# Patient Record
Sex: MALE | Race: WHITE
[De-identification: names, ages, dates, MRNs, and addresses within clinical notes are randomized per-mention and may not be internally consistent; named-entity substitution may affect disease eponyms.]

---

## 2018-01-29 ENCOUNTER — HOSPITAL ENCOUNTER (INPATIENT)
Dept: HOSPITAL 96 - M.ERS | Age: 78
LOS: 3 days | Discharge: HOME | DRG: 683 | End: 2018-02-01
Attending: INTERNAL MEDICINE | Admitting: INTERNAL MEDICINE
Payer: OTHER GOVERNMENT

## 2018-01-29 VITALS — DIASTOLIC BLOOD PRESSURE: 59 MMHG | SYSTOLIC BLOOD PRESSURE: 131 MMHG

## 2018-01-29 VITALS — DIASTOLIC BLOOD PRESSURE: 73 MMHG | SYSTOLIC BLOOD PRESSURE: 157 MMHG

## 2018-01-29 VITALS — BODY MASS INDEX: 23.62 KG/M2 | HEIGHT: 70 IN | WEIGHT: 165 LBS

## 2018-01-29 DIAGNOSIS — M94.0: ICD-10-CM

## 2018-01-29 DIAGNOSIS — K21.9: ICD-10-CM

## 2018-01-29 DIAGNOSIS — Z88.0: ICD-10-CM

## 2018-01-29 DIAGNOSIS — N17.9: Primary | ICD-10-CM

## 2018-01-29 DIAGNOSIS — I12.0: ICD-10-CM

## 2018-01-29 DIAGNOSIS — E11.22: ICD-10-CM

## 2018-01-29 DIAGNOSIS — Z86.73: ICD-10-CM

## 2018-01-29 DIAGNOSIS — N18.3: ICD-10-CM

## 2018-01-29 DIAGNOSIS — Z79.899: ICD-10-CM

## 2018-01-29 DIAGNOSIS — E44.1: ICD-10-CM

## 2018-01-29 DIAGNOSIS — Z79.4: ICD-10-CM

## 2018-01-29 DIAGNOSIS — I25.10: ICD-10-CM

## 2018-01-29 LAB
ABSOLUTE BASOPHILS: 0 THOU/UL (ref 0–0.2)
ABSOLUTE EOSINOPHILS: 0.2 THOU/UL (ref 0–0.7)
ABSOLUTE MONOCYTES: 0.8 THOU/UL (ref 0–1.2)
ALBUMIN SERPL-MCNC: 4 G/DL (ref 3.4–5)
ALP SERPL-CCNC: 101 U/L (ref 46–116)
ALT SERPL-CCNC: 24 U/L (ref 30–65)
ANION GAP SERPL CALC-SCNC: 9 MMOL/L (ref 7–16)
APTT BLD: 26.7 SECONDS (ref 25–31.3)
AST SERPL-CCNC: 23 U/L (ref 15–37)
BASOPHILS NFR BLD AUTO: 0.5 %
BILIRUB SERPL-MCNC: 0.7 MG/DL
BUN SERPL-MCNC: 42 MG/DL (ref 7–18)
CALCIUM SERPL-MCNC: 9.1 MG/DL (ref 8.5–10.1)
CHLORIDE SERPL-SCNC: 105 MMOL/L (ref 98–107)
CO2 SERPL-SCNC: 27 MMOL/L (ref 21–32)
CREAT SERPL-MCNC: 2.4 MG/DL (ref 0.6–1.3)
EOSINOPHIL NFR BLD: 1.9 %
GLUCOSE SERPL-MCNC: 127 MG/DL (ref 70–99)
GRANULOCYTES NFR BLD MANUAL: 61.1 %
HCT VFR BLD CALC: 41.9 % (ref 42–52)
HGB BLD-MCNC: 13.9 GM/DL (ref 14–18)
INR PPP: 1.1
LIPASE: 258 U/L (ref 73–393)
LYMPHOCYTES # BLD: 2.6 THOU/UL (ref 0.8–5.3)
LYMPHOCYTES NFR BLD AUTO: 28.1 %
MAGNESIUM SERPL-MCNC: 2.1 MG/DL (ref 1.8–2.4)
MCH RBC QN AUTO: 29.4 PG (ref 26–34)
MCHC RBC AUTO-ENTMCNC: 33.2 G/DL (ref 28–37)
MCV RBC: 88.6 FL (ref 80–100)
MONOCYTES NFR BLD: 8.4 %
MPV: 8.7 FL. (ref 7.2–11.1)
NEUTROPHILS # BLD: 5.7 THOU/UL (ref 1.6–8.1)
NT-PRO BRAIN NAT PEPTIDE: 58 PG/ML (ref ?–300)
NUCLEATED RBCS: 0 /100WBC
PLATELET COUNT*: 196 THOU/UL (ref 150–400)
POTASSIUM SERPL-SCNC: 4.3 MMOL/L (ref 3.5–5.1)
PROT SERPL-MCNC: 7.7 G/DL (ref 6.4–8.2)
PROTHROMBIN TIME: 11.1 SECONDS (ref 9.2–11.5)
RBC # BLD AUTO: 4.74 MIL/UL (ref 4.5–6)
RDW-CV: 12.9 % (ref 10.5–14.5)
SODIUM SERPL-SCNC: 141 MMOL/L (ref 136–145)
TROPONIN-I LEVEL: <0.06 NG/ML (ref ?–0.06)
WBC # BLD AUTO: 9.2 THOU/UL (ref 4–11)

## 2018-01-29 NOTE — EKG
[place] [address]
Phone:  [phone]                     ELECTROCARDIOGRAM REPORT      
_______________________________________________________________________________
 
Name:       PICKARDELVIRA                    Room:           [place] [address]    ADM IN  
[facility]#:  [id number]      Account #:      [id number]  
Admission:  18     Attend Phys:    Joe Boucher MD 
Discharge:               Date of Birth:  [birth year]  
         Report #: [id number]
    [id number]
_______________________________________________________________________________
THIS REPORT FOR:  //name//                      
 
                         [facility] ED
                                       
Test Date:    2018               Test Time:    14:10:48
Pat Name:     ELVIRA PICKARD              Department:   
Patient ID:   SMAMO-                   Room:          
Gender:                               Technician:   DAVID NUR
:          1940               Requested By: Gary Velasquez
Order Number: [id number] MD:   Andre Lewis
                                 Measurements
Intervals                              Axis          
Rate:         73                       P:            82
MO:           201                      QRS:          -10
QRSD:         88                       T:            41
QT:           369                                    
QTc:          407                                    
                           Interpretive Statements
Sinus rhythm
Borderline low voltage, extremity leads
Compared to ECG 2017 04:02:11
nonspecific intraventricular defect no longer noted
 
Electronically Signed On 2018 16:53:13 CST by Andre Lewis
[email or web address]
 
 
 
 
 
 
 
 
 
 
 
 
 
 
 
 
 
 
  <ELECTRONICALLY SIGNED>
                                           By: Andre Lewis MD, [facility]      
  18     1653
D: 18 1410   _____________________________________
T: 18 1410   Andre Lewis MD, [facility]        /EPI

## 2018-01-29 NOTE — NUR
ADMISSION HX AND ASSESSMENT COMPLETED BY HOUSE SUPERVISOR, THIS RN AGREES WITH
CHARTING. PATIENT ON ROOM AIR WITH O2 SATS 98%. PATIENT DENIES CHEST PAIN AND
DISCOMFORT. NO DISTRESS NOTED. PATIENT A&OX3, FORGETFUL AND CONFUSED AT TIMES.
IV REINFORCED WITH COBAN. PATIENT ORIENTED TO ROOM AND CALL LIGHT. CALL LIGHT
WITHIN REACH.

## 2018-01-30 VITALS — SYSTOLIC BLOOD PRESSURE: 127 MMHG | DIASTOLIC BLOOD PRESSURE: 69 MMHG

## 2018-01-30 VITALS — SYSTOLIC BLOOD PRESSURE: 145 MMHG | DIASTOLIC BLOOD PRESSURE: 78 MMHG

## 2018-01-30 VITALS — DIASTOLIC BLOOD PRESSURE: 53 MMHG | SYSTOLIC BLOOD PRESSURE: 101 MMHG

## 2018-01-30 VITALS — SYSTOLIC BLOOD PRESSURE: 127 MMHG | DIASTOLIC BLOOD PRESSURE: 100 MMHG

## 2018-01-30 VITALS — DIASTOLIC BLOOD PRESSURE: 58 MMHG | SYSTOLIC BLOOD PRESSURE: 119 MMHG

## 2018-01-30 LAB
ABSOLUTE BASOPHILS: 0 THOU/UL (ref 0–0.2)
ABSOLUTE EOSINOPHILS: 0.1 THOU/UL (ref 0–0.7)
ABSOLUTE MONOCYTES: 0.6 THOU/UL (ref 0–1.2)
ANION GAP SERPL CALC-SCNC: 10 MMOL/L (ref 7–16)
BASOPHILS NFR BLD AUTO: 0.6 %
BUN SERPL-MCNC: 37 MG/DL (ref 7–18)
CALCIUM SERPL-MCNC: 8.3 MG/DL (ref 8.5–10.1)
CHLORIDE SERPL-SCNC: 109 MMOL/L (ref 98–107)
CO2 SERPL-SCNC: 24 MMOL/L (ref 21–32)
CREAT SERPL-MCNC: 2.1 MG/DL (ref 0.6–1.3)
EOSINOPHIL NFR BLD: 2.7 %
GLUCOSE SERPL-MCNC: 177 MG/DL (ref 70–99)
GRANULOCYTES NFR BLD MANUAL: 52.7 %
HCT VFR BLD CALC: 37.8 % (ref 42–52)
HGB BLD-MCNC: 12.8 GM/DL (ref 14–18)
LYMPHOCYTES # BLD: 1.7 THOU/UL (ref 0.8–5.3)
LYMPHOCYTES NFR BLD AUTO: 32.8 %
MCH RBC QN AUTO: 30.1 PG (ref 26–34)
MCHC RBC AUTO-ENTMCNC: 33.9 G/DL (ref 28–37)
MCV RBC: 88.9 FL (ref 80–100)
MONOCYTES NFR BLD: 11.2 %
MPV: 9.4 FL. (ref 7.2–11.1)
NEUTROPHILS # BLD: 2.8 THOU/UL (ref 1.6–8.1)
NT-PRO BRAIN NAT PEPTIDE: 85 PG/ML (ref ?–300)
NUCLEATED RBCS: 0 /100WBC
PLATELET COUNT*: 163 THOU/UL (ref 150–400)
POTASSIUM SERPL-SCNC: 4.1 MMOL/L (ref 3.5–5.1)
RBC # BLD AUTO: 4.26 MIL/UL (ref 4.5–6)
RDW-CV: 12.8 % (ref 10.5–14.5)
SODIUM SERPL-SCNC: 143 MMOL/L (ref 136–145)
TROPONIN-I LEVEL: <0.06 NG/ML (ref ?–0.06)
WBC # BLD AUTO: 5.2 THOU/UL (ref 4–11)

## 2018-01-30 NOTE — NUR
JOSE ELIAS RESTING IN BED.  RENAL US, ECHO, VENOUS DOPPLERS ALL COMPLETED TODAY
AND RESULTS POSTED.  ASIFNET TO [facility] STRESS TESTING ON 01/31/18.  VITAL SIGNS
STABLE AND PATIENT IN NOAPPARENT DISTRESS AT THIS TIME.  HOURLY ROUNDING
COMPLETED FOR PATIENT SAFETY.  NPO AFTER MIDNINGHT

## 2018-01-30 NOTE — NUR
PATIENT REMAINS CONFUSED THROUGHOUT SHIFT, OCCASIONALLY ATTEMPTING TO PULL OUT
IV AND CLIMB OUT OF BED WITHOUT ASSISTANCE. ATTEMPTED TO ORIENT PATIENT
MULTIPLE TIMES WITH NO SUCCESS. FALL PRECAUTIONS IN PLACE. HOURLY ROUNDING
OBSERVED. CALL LIGHT WITHIN REACH

## 2018-01-30 NOTE — 2DMMODE
[place] [address]
Phone:  [phone] 2 D/M-MODE ECHOCARDIOGRAM     
_______________________________________________________________________________
 
Name:         ELVIRA PICKARD                 Room:          [address] [place] 
[facility]#:    [id number]     Account #:     [id number]  
Admission:    18    Attend Phys:   Joe Boucher, 
Discharge:                Date of Birth: [birth year]  
Date of Service: 18 1706  Report #:      [id number]
        [id number]
_______________________________________________________________________________
THIS REPORT FOR:  //name//                      
 
 
--------------- APPROVED REPORT --------------
 
 
Study performed:  2018 16:20:04
 
EXAM: Comprehensive 2D, Doppler, and color-flow 
Echocardiogram 
Patient Location: In-Patient   
Room #:  [facility]     Status:  routine
 
      BSA:         2.00
HR: 71 bpm BP:          119/58 mmHg 
Rhythm: NSR     
 
Other Information 
Study Quality: Good
 
Indications
Chest Pain
 
2D Dimensions
   LVEF(%):  66.56 (&gt;50%)
IVSd:  10.57 (7-11mm) LVOT Diam:  21.08 (18-24mm) 
LVDd:  44.10 mm  
PWd:  11.07 (7-11mm) Ascending Ao:  33.22 (22-36mm)
LVDs:  27.97 (25-40mm) 
Aortic Root:  32.47 mm 
   Cunningham's LVEF:  66.56 %
 
Volumes
Left Atrial Volume (Systole) 
    LA ESV Index:  25.30 mL/m2
 
Aortic Valve
AoV Peak Chetan.:  1.57 m/s 
AO Peak Gr.:  9.87 mmHg  LVOT Max P.17 mmHg
AO Mean Gr.:  5.60 mmHg  LVOT Mean P.55 mmHg
    LVOT Max V:  1.14 m/s
AO V2 VTI:  31.58 cm  LVOT Mean V:  0.73 m/s
LORRAINE (VTI):  2.60 cm2  LVOT V1 VTI:  23.57 cm
 
Mitral Valve
    E/A Ratio:  1.26
 
 
[place] [address]
Phone:  [phone]                     2 D/M-MODE ECHOCARDIOGRAM     
_______________________________________________________________________________
 
Name:         ELVIRA PICKARD                 Room:          [address] [place] 
[facility]#:    [id number]     Account #:     [id number]  
Admission:    18    Attend Phys:   Joe Boucher, 
Discharge:                Date of Birth: [birth year]  
Date of Service: 18 1706  Report #:      [id number]
        [id number]
_______________________________________________________________________________
    MV Decel. Time:  169.90 ms
MV E Max Chetan.:  0.93 m/s 
MV PHT:  49.27 ms  
MVA (PHT):  4.47 cm2  
 
TDI
E/Lateral E':  8.45 E/Medial E':  7.15
   Medial E' Chetan.:  0.13 m/s
   Lateral E' Chetan.:  0.11 m/s
 
Pulmonary Valve
PV Peak Chetan.:  0.98 m/s PV Peak Gr.:  3.87 mmHg
 
Left Ventricle
The left ventricle is normal size. There is normal LV segmental wall 
motion. There is normal left ventricular wall thickness. Left 
ventricular systolic function is normal. LVEF is 60-65%. The left 
ventricular diastolic function is normal.
 
Right Ventricle
The right ventricle is normal size. The right ventricular systolic 
function is normal.
 
Atria
The left atrium size is normal. The right atrium size is 
normal.
 
Aortic Valve
The aortic valve is normal in structure. No aortic regurgitation is 
present. There is no aortic valvular stenosis.
 
Mitral Valve
The mitral valve is normal in structure. Trace mitral regurgitation. 
No evidence of mitral valve stenosis.
 
Tricuspid Valve
The tricuspid valve is normal in structure. Unable to assess PA 
pressure. Trace tricuspid regurgitation.
 
Pulmonic Valve
The pulmonary valve is normal in structure. There is no pulmonic 
valvular regurgitation.
 
Great Vessels
The aortic root is normal in size. IVC is normal in size and 
collapses with &gt;50% inspiration
 
 
[place] [address]
Phone:  [phone]                     2 D/M-MODE ECHOCARDIOGRAM     
_______________________________________________________________________________
 
Name:         ELVIRA PICKARD                 Room:          [address] [place] 
[facility].#:    [id number]     Account #:     [id number]  
Admission:    18    Attend Phys:   Joe Boucher, 
Discharge:                Date of Birth: [birth year]  
Date of Service: 18 1706  Report #:      [id number]
        [id number]
_______________________________________________________________________________
 
Pericardium
There is no pericardial effusion.
 
&lt;Conclusion&gt;
The left ventricle is normal size.
There is normal left ventricular wall thickness.
Left ventricular systolic function is normal.
LVEF is 60-65%.
The left ventricular diastolic function is normal.
IVC is normal in size and collapses with &gt;50% inspiration
 
 
 
 
 
 
 
 
 
 
 
 
 
 
 
 
 
 
 
 
 
 
 
 
 
 
 
 
 
 
 
 
 
  <ELECTRONICALLY SIGNED>
                                           By: Tevin Chavis MD, FACC   
  18     1706
D: 18 170   _____________________________________
T: 18   Tevin Chavis MD, FACC     /INF

## 2018-01-30 NOTE — NUR
ASSESSMENT AND VITALS COMPLETED AS CHARTED, VSS. CARDIAC MONITOR IN PLACE
TRACING SR. PATIENT DENIES PAIN AND DISCOMFORT. PATIENT ON ROOM AIR WITH SATS
UPPER 90'S. GOAL THIS SHIFT IS TO REMAIN FREE OF CHEST PAIN AND REST
COMFORTABLY. CALL LIGHT WITHIN REACH

## 2018-01-31 VITALS — SYSTOLIC BLOOD PRESSURE: 115 MMHG | DIASTOLIC BLOOD PRESSURE: 67 MMHG

## 2018-01-31 VITALS — DIASTOLIC BLOOD PRESSURE: 53 MMHG | SYSTOLIC BLOOD PRESSURE: 113 MMHG

## 2018-01-31 VITALS — SYSTOLIC BLOOD PRESSURE: 169 MMHG | DIASTOLIC BLOOD PRESSURE: 84 MMHG

## 2018-01-31 VITALS — DIASTOLIC BLOOD PRESSURE: 71 MMHG | SYSTOLIC BLOOD PRESSURE: 139 MMHG

## 2018-01-31 VITALS — SYSTOLIC BLOOD PRESSURE: 110 MMHG | DIASTOLIC BLOOD PRESSURE: 48 MMHG

## 2018-01-31 VITALS — SYSTOLIC BLOOD PRESSURE: 118 MMHG | DIASTOLIC BLOOD PRESSURE: 66 MMHG

## 2018-01-31 VITALS — DIASTOLIC BLOOD PRESSURE: 66 MMHG | SYSTOLIC BLOOD PRESSURE: 118 MMHG

## 2018-01-31 VITALS — SYSTOLIC BLOOD PRESSURE: 123 MMHG | DIASTOLIC BLOOD PRESSURE: 66 MMHG

## 2018-01-31 LAB
ABSOLUTE BASOPHILS: 0 THOU/UL (ref 0–0.2)
ABSOLUTE EOSINOPHILS: 0.2 THOU/UL (ref 0–0.7)
ABSOLUTE MONOCYTES: 0.7 THOU/UL (ref 0–1.2)
ALBUMIN SERPL-MCNC: 3 G/DL (ref 3.4–5)
ALP SERPL-CCNC: 89 U/L (ref 46–116)
ALT SERPL-CCNC: 16 U/L (ref 30–65)
ANION GAP SERPL CALC-SCNC: 8 MMOL/L (ref 7–16)
AST SERPL-CCNC: 22 U/L (ref 15–37)
BASOPHILS NFR BLD AUTO: 0.6 %
BILIRUB SERPL-MCNC: 0.7 MG/DL
BUN SERPL-MCNC: 23 MG/DL (ref 7–18)
CALCIUM SERPL-MCNC: 8.1 MG/DL (ref 8.5–10.1)
CHLORIDE SERPL-SCNC: 113 MMOL/L (ref 98–107)
CO2 SERPL-SCNC: 25 MMOL/L (ref 21–32)
CREAT SERPL-MCNC: 1.5 MG/DL (ref 0.6–1.3)
EOSINOPHIL NFR BLD: 3.2 %
GLUCOSE SERPL-MCNC: 98 MG/DL (ref 70–99)
GRANULOCYTES NFR BLD MANUAL: 52.3 %
HCT VFR BLD CALC: 36.8 % (ref 42–52)
HGB BLD-MCNC: 12.5 GM/DL (ref 14–18)
LYMPHOCYTES # BLD: 1.9 THOU/UL (ref 0.8–5.3)
LYMPHOCYTES NFR BLD AUTO: 32 %
MCH RBC QN AUTO: 29.8 PG (ref 26–34)
MCHC RBC AUTO-ENTMCNC: 34.1 G/DL (ref 28–37)
MCV RBC: 87.5 FL (ref 80–100)
MONOCYTES NFR BLD: 11.9 %
MPV: 8.9 FL. (ref 7.2–11.1)
NEUTROPHILS # BLD: 3.1 THOU/UL (ref 1.6–8.1)
NUCLEATED RBCS: 0 /100WBC
PLATELET COUNT*: 170 THOU/UL (ref 150–400)
POTASSIUM SERPL-SCNC: 4.3 MMOL/L (ref 3.5–5.1)
PROT SERPL-MCNC: 5.6 G/DL (ref 6.4–8.2)
RBC # BLD AUTO: 4.21 MIL/UL (ref 4.5–6)
RDW-CV: 12.9 % (ref 10.5–14.5)
SODIUM SERPL-SCNC: 146 MMOL/L (ref 136–145)
WBC # BLD AUTO: 5.9 THOU/UL (ref 4–11)

## 2018-01-31 NOTE — NUR
CM ASSESSMENT:
Pt is A&Ox1-2 with confusion. Resides at home with his wife. Pt is independent
with bathing and grooming, wife assists as needed and completes cooking,
cleaning and driving. Pt has a walker and cane at home that he can use. No hx
of HH or SNF. Goal is to return home once medically stable for dc. Following
for dc needs.

## 2018-01-31 NOTE — CON
[address]  [address]                    CONSULTATION                  
_______________________________________________________________________________
 
Name:       ELVIRA PICKARD                  Room:           [address] [place]  
.R.#:  [id number]      Account #:      [id number]  
Admission:  01/29/18     Attend Phys:    Joe Boucher MD 
Discharge:               Date of Birth:  [birth year]  
         Report #: [id number]
                                                                     [id number]  
_______________________________________________________________________________
THIS REPORT FOR:  //name//                      
 
CC: Joe Boucher
    [facility]
 
DATE OF SERVICE:  01/30/2018
 
 
REQUESTING PHYSICIAN:  Joe Boucher MD.
 
REASON FOR CONSULTATION:  Acute on chronic kidney disease.
 
HISTORY OF PRESENT ILLNESS:  The patient is a 77-year-old white male with
medical history significant for diabetes mellitus type 2 and hypertension, who
presents to the hospital with complaints of chest discomfort.
 
The patient states that the chest discomfort started in the morning of
01/29/2018.  He used nitroglycerin, but did not get any relief.  He was afraid
of having heart attack and he was brought to the Emergency Room.
 
The patient himself is very poor historian because of history of cerebrovascular
accident.
 
At the time of my examination, he feels much better, states he has no
complaints.
 
PAST MEDICAL HISTORY:
1.  Diabetes mellitus type 2.
2.  Hypertension.
3.  Chronic kidney disease, stage 3.
4.  Coronary artery disease.
 
FAMILY HISTORY:  Noncontributory.
 
SOCIAL HISTORY:  No current tobacco or alcohol abuse.
 
MEDICATIONS:  Prior to admission are reviewed.  From my standpoint, he was on
furosemide 40 mg a day and losartan 25 mg a day.
 
REVIEW OF SYSTEMS:  Positive for the symptoms as I mentioned earlier.  At the
time of my examination, all systems reviewed and are negative.
 
PHYSICAL EXAMINATION:
GENERAL:  Awake, alert, and oriented, in no acute distress.
VITAL SIGNS:  His blood pressure is 119/58 and heart rate 73, afebrile.
 
 
 
[place] [address]                    CONSULTATION                  
_______________________________________________________________________________
 
Name:       ELVIRA PICKARD                  Room:           [address] [place]  
[facility]#:  [id number]      Account #:      [id number]  
Admission:  01/29/18     Attend Phys:    Joe Boucher MD 
Discharge:               Date of Birth:  [birth year]  
         Report #: [id number]
                                                                     [id number]  
_______________________________________________________________________________
HEENT:  His pupils are round.
NECK:  Supple.
LUNGS:  Clear.
CARDIOVASCULAR:  Regular rate.
ABDOMEN:  Soft.
EXTREMITIES:  Lower extremity, no edema.
 
Here in the hospital, he was placed on aspirin, Norvasc, Protonix, Keppra,
insulin, and Lipitor, but his diuretics and his lisinopril are on hold.
 
LABORATORY AND DIAGNOSTIC DATA:  His lab report revealed serum sodium 143,
potassium 4.1, chloride 109, carbon dioxide 24, BUN 37, and creatinine 2.1. 
Renal ultrasound was done today and was unremarkable with normal echogenicity.
 
ASSESSMENT AND PLAN:  A 77-year-old gentleman admitted for chest discomfort. 
Cardiology is on the case.  From my standpoint, his creatinine is improving.  I
do not know exact baseline of his creatinine, but apparently he has chronic
kidney disease.  I will try to get report from his primary care doctor.  From my
standpoint, doing well.  We will continue to monitor.  He will need to have
follow up with us after discharge.
 
Thank you very much for asking my opinion on acute and chronic kidney disease of
this patient.
 
 
 
 
 
 
 
 
 
 
 
 
 
 
 
 
 
 
 
 
 
<ELECTRONICALLY SIGNED>
                                        By:  Amari Pulido MD      
01/31/18     1501
D: 01/30/18 1557_______________________________________
T: 01/31/18 0055Alexmary Pulido MD         /PMT

## 2018-01-31 NOTE — NUR
ASSESSMENT AND VITALS COMPLETED AS CHARTED, VSS. CARDIAC MONITOR IN PLACE
TRACING SR, 1D AVB. PATIENT DENIES PAIN AND DISCOMFORT. PATIENT ON ROOM AIR
WITH O2 SATS 98%. PATIENT RESTLESS, IMPULSIVE, AND PULLING OFF HEART MONITOR
UPON ASSESSMENT. ORDERS RECEIVED FOR SEROQUEL ONETIME. PATIENT RESTING
COMFORTABLY AT THIS TIME. FALL PRECAUTIONS IN PLACE. CALL LIGHT WITHIN REACH

## 2018-01-31 NOTE — NUR
PATIENT PROGRESSING TOWARDS GOALS: PATIENT DENIES CHEST PAIN THROUGHOUT SHIFT.
NPO FOR STRESS TEST THIS AM. PATIENT DID HAVE AN EPISODE OF IMPULSIVENESS,
TAKING OFF CARDIAC MONITOR MULTIPLE TIMES AND ATTEMPTING TO DISCONTINUE IV.
ORDERS RECEIVED FOR SEROQUEL ONE TIME. PATIENT WAS ABLE TO REST COMFORTABLY
AFTER ADMINISTRATION. HOURLY ROUNDING OSBERVED. CALL LIGHT WITHIN REACH

## 2018-01-31 NOTE — NUR
ASSUMED CARE OF PT AROUND 0700 THIS AM. REFER TO ASSESSMENT. PT VOICES NO
CONCERNS THIS AM. ALERT AND ORIENTED TO PERSON ONLY. PT'S WIFE AT BEDSIDE AND
STATES THIS IS BASELINE FOR PT. PT VERY IMPULSIVE. FALL PRECAUTIONS IN PLACE.
TELE SR. CLWR. WCTM.

## 2018-01-31 NOTE — NUR
PT NOT PROGRESSING TOWARDS GOALS THIS SHIFT. PT IMPULSIVE AND NONCOMPLIANT
WITH FALL RISK PRECAUTIONS. ATTEMPTED TO HAVE WIFE AT BEDSIDE TO INCREASE PT
SAFETY AND WIFE DID NOT MAKE ANY ATTEMPT TO REORIENT PT WHEN PULLING OFF
TUBES/ LINES AND ATTEMPTING TO GET OUT OF. CONSIDER 1:1 FOR SAFETY IF
CONTINUES. CARDIAC STRESS TEST RESCHEDULED FOR TOMORROW D/T PT RECEIVED
AGGRENOX DOSE YESTERDAY. NO OTHER CONCERNS AT THIS TIME. CLWR. WCTM.

## 2018-02-01 VITALS — DIASTOLIC BLOOD PRESSURE: 64 MMHG | SYSTOLIC BLOOD PRESSURE: 125 MMHG

## 2018-02-01 VITALS — SYSTOLIC BLOOD PRESSURE: 134 MMHG | DIASTOLIC BLOOD PRESSURE: 74 MMHG

## 2018-02-01 VITALS — DIASTOLIC BLOOD PRESSURE: 67 MMHG | SYSTOLIC BLOOD PRESSURE: 132 MMHG

## 2018-02-01 VITALS — SYSTOLIC BLOOD PRESSURE: 125 MMHG | DIASTOLIC BLOOD PRESSURE: 64 MMHG

## 2018-02-01 LAB
ABSOLUTE BASOPHILS: 0.1 THOU/UL (ref 0–0.2)
ABSOLUTE EOSINOPHILS: 0.2 THOU/UL (ref 0–0.7)
ABSOLUTE MONOCYTES: 0.6 THOU/UL (ref 0–1.2)
ALBUMIN SERPL-MCNC: 3 G/DL (ref 3.4–5)
ALP SERPL-CCNC: 85 U/L (ref 46–116)
ALT SERPL-CCNC: 19 U/L (ref 30–65)
ANION GAP SERPL CALC-SCNC: 4 MMOL/L (ref 7–16)
AST SERPL-CCNC: 21 U/L (ref 15–37)
BASOPHILS NFR BLD AUTO: 1 %
BILIRUB SERPL-MCNC: 0.7 MG/DL
BUN SERPL-MCNC: 19 MG/DL (ref 7–18)
CALCIUM SERPL-MCNC: 8.4 MG/DL (ref 8.5–10.1)
CHLORIDE SERPL-SCNC: 113 MMOL/L (ref 98–107)
CO2 SERPL-SCNC: 25 MMOL/L (ref 21–32)
CREAT SERPL-MCNC: 1.5 MG/DL (ref 0.6–1.3)
EOSINOPHIL NFR BLD: 4.7 %
GLUCOSE SERPL-MCNC: 184 MG/DL (ref 70–99)
GRANULOCYTES NFR BLD MANUAL: 48.6 %
HCT VFR BLD CALC: 38.1 % (ref 42–52)
HGB BLD-MCNC: 12.9 GM/DL (ref 14–18)
LYMPHOCYTES # BLD: 1.6 THOU/UL (ref 0.8–5.3)
LYMPHOCYTES NFR BLD AUTO: 33.3 %
MCH RBC QN AUTO: 29.4 PG (ref 26–34)
MCHC RBC AUTO-ENTMCNC: 33.8 G/DL (ref 28–37)
MCV RBC: 86.9 FL (ref 80–100)
MONOCYTES NFR BLD: 12.4 %
MPV: 8.8 FL. (ref 7.2–11.1)
NEUTROPHILS # BLD: 2.3 THOU/UL (ref 1.6–8.1)
NUCLEATED RBCS: 0 /100WBC
PLATELET COUNT*: 162 THOU/UL (ref 150–400)
POTASSIUM SERPL-SCNC: 4.3 MMOL/L (ref 3.5–5.1)
PROT SERPL-MCNC: 5.9 G/DL (ref 6.4–8.2)
RBC # BLD AUTO: 4.38 MIL/UL (ref 4.5–6)
RDW-CV: 13 % (ref 10.5–14.5)
SODIUM SERPL-SCNC: 142 MMOL/L (ref 136–145)
WBC # BLD AUTO: 4.8 THOU/UL (ref 4–11)

## 2018-02-01 NOTE — NUR
ASSUMEDPT CARE AT 0730, FULL ASSESMENT DONE AS CHARTED. PT A/O X3, IS CONUSED
AT TIMES AND IMPULSIVE, SITTER WITH PT AT THIS TIME FOR SAFTY. PTS VSS, SR ON
THE MONITOR, DENIES PAIN. FALL PRECATUIONS IN PLACE. CALL LIGHT IN REACH BUT P
DOES NOT USE IT APPROPIALTY. WILL CONINTUE WITH PLAN OF CARE.

## 2018-02-01 NOTE — NUR
Pt should dc to home today, pending stress test. Wife expressed wanting HH, CM
[facility], and was informed that wife/Pt will need to
contact Pt's PCP, to set up HH. Left info in dc summary.

## 2018-02-01 NOTE — NUR
PATIENT REMAINED IMPULSIVE OVERNIGHT AND CLIMBING OUT OF BED. PATIENT CLIMBED
OUT OF BED AT 2320, TRIPPED OVER THE CHAIR AND BUMPED HIS HEAD ON THE
WALL SUCTION BOX. PATIENT DENIES PAIN. VITAL SIGNS TAKEN AND STABLE. NOTIFIED
DR. NAZARIO OF THE SITUATION, NO NEW ORDERS RECEIVED. FALL PRECAUTIONS IN
PLACE. CALL LIGHT WITHIN REACH

## 2018-02-01 NOTE — CARDNUC
[place] [address]
Phone:  [phone]                     CARDIAC NUCLEAR IMAGING REPORT
_______________________________________________________________________________
 
Name:         ELVIRA PICKARD                 Room:          [address] [place] 
[facility]#:    [id number]     Account #:     [id number]  
Admission:    18    Attend Phys:   Joe Boucher, 
Discharge:                Date of Birth: [birth year]  
Date of Service: 18 1459  Report #:      [id number]
        [id number] 
_______________________________________________________________________________
THIS REPORT FOR:  //name//                      
 
 
--------------- APPROVED REPORT --------------
 
 
Exam: Nuclear Stress Test
Indication: Chest pain
Patient Location: In-Patient
Room #: 214
Stress Tech: Kaia Yoon
Stress Nurse: Svetlana Bruner RN
NM Tech:GHADA Anderson
 
Ht: 5 ft 10 in  Wt: 180 lbs  BSA:  2.00 m2
    BMI:  25.82
 
Medical History
Medical History: cad, cva, hyperlipidemia, htn, diabetes
Medications: amlodipine, aspiriain, dipyridamole, 
atorvastatin
Allergies: penicillin
Cardiac Risk Factors: age, hyperlipidemia, htn, diabetes
Exercise History: Sedentary
Meds Held (48 hrs): dipyridomole
 
Stress Test Details
Stress Test:  Pharmacologic stress testing performed using 0.4 mg of 
regadenoson per 5 mL given IV over 10 seconds.      
  Reason for pharmacologic stress test: physical 
limitation.      
HR           
Resting HR:            94 bpm   Max Heart Rate (APMHR): 145 bpm  
Max HR Achieved:  102 bpm   Target HR (85% APMHR): 123 bpm  
% of APMHR:         70         
Recovery HR:            98 bpm        
 
BP           
Resting BP:  153/78 mmHg        
Max BP:       103/48 mmHg        
 
ECG           
Resting ECG:  Sinus Rhythm, normal EKG       
Stress ECG:     Sinus Rhythm, normal EKG      
ST Change: None          
Arrhythmia:    None         
 
 
[place] [address]
Phone:  [phone]                     CARDIAC NUCLEAR IMAGING REPORT
_______________________________________________________________________________
 
Name:         VIRAJ PICKARDDIE DANA                 Room:          [address]#:    [id number]     Account #:     [id number]  
Admission:    18    Attend Phys:   Joe Boucher, 
Discharge:                Date of Birth: [birth year]  
Date of Service: 18 1459  Report #:      [id number]
        [id number] 
_______________________________________________________________________________
Recovery ECG: Sinus Rhythm, normal EKG       
Recovery ST Change: None        
Recovery Arrhythmia: None        
 
Clinical
Reason for Termination: Completed protocol
Exercise duration: 0 min  sec
Exercise capacity: 170 METs
The patient had no significant symptoms with Lexiscan 
infusion.
 
Nurse Comments
pt tolerated well
 
Stress ECG Conclusion
The baseline 12-lead electrocardiogram showed sinus rhythm without 
significant ST or T wave abnormality. EKGs obtained during and post 
Lexiscan infusion showed sinus rhythm with no significant ST or T 
wave changes when compared to baseline. There were no significant 
stress-induced arrhythmias.
 
NM EXAM: Myocardial Perfusion REST/STRESS
Imaging Protocol: Rest Tc-99m/Stress Tc-99m 1 day
 
Resting Data
Rest SPECT myocardial perfusion imaging was performed in supine 
position 30 minutes following the intravenous injection of 11.7 mCi 
of Tc-99m Sestamibi.
Time of rest injection: 0945     
Time of rest imagin     
The images were gated to evaluate regional wall motion and calculate 
left ventricular ejection fraction. 
Administration Route: IV
 
Pharmacologic Stress
Pharmacologic stress test was performed by injecting Regadenoson 0.4 
mg IV push followed by the intravenous injection of 36.0 mCi of 
Tc-99m Sestamibi.
Time of stress injection: 1100     
Time of stress imagin     
Administration Route: IV
Gated Stress SPECT was performed 40 minutes after stress 
injection.
The images were gated to evaluate regional wall motion and calculate 
left ventricular ejection fraction. 
Prone imaging was performed.
 
 
[place] [address]
Phone:  [phone]                     CARDIAC NUCLEAR IMAGING REPORT
_______________________________________________________________________________
 
Name:         ELVIRA PICKARD                 Room:          [address]#:    [id number]     Account #:     [id number]  
Admission:    18    Attend Phys:   Joe Boucher, 
Discharge:                Date of Birth: [birth year]  
Date of Service: 18 1459  Report #:      [id number]
        [id number] 
_______________________________________________________________________________
 
Study Quality
Study: Good
Artifact: No artifact 
 
Study Data
At rest, the left ventricular ejection fraction was 89%..   
Post stress, the left ventricular ejection was 76%..   
TID = 1.17.       
 
Perfusion
Normal left ventricular perfusion.
 
Wall Motion
Normal left ventricular wall motion.
 
Nuclear Conclusion
ECG Findings: negative for ischemia
Clinical Findings: negative for ischemia
Nuclear Findings: negative for ischemia
Exercise Capacity: not assessed
Left Ventricular Function: normal
Risk Study: low
Myocardial perfusion images show no defect to suggest infarct or 
ischemia. Left ventricular systolic function is normal on gated 
studies. This is a low risk study. 
 
&lt;Conclusion&gt;
The baseline 12-lead electrocardiogram showed sinus rhythm without 
significant ST or T wave abnormality. EKGs obtained during and post 
Lexiscan infusion showed sinus rhythm with no significant ST or T 
wave changes when compared to baseline. There were no significant 
stress-induced arrhythmias.
 
 
 
 
 
 
 
 
 
 
 
  <ELECTRONICALLY SIGNED>
                                           By: Tevin Chavis MD, FACC   
  18     1459
D: 18 1459   _____________________________________
T: 18 1459   Tevin Chavis MD, FACC     /INF

## 2018-05-22 ENCOUNTER — HOSPITAL ENCOUNTER (EMERGENCY)
Dept: HOSPITAL 96 - M.ERS | Age: 78
Discharge: HOME | End: 2018-05-22
Payer: OTHER GOVERNMENT

## 2018-05-22 VITALS — SYSTOLIC BLOOD PRESSURE: 143 MMHG | DIASTOLIC BLOOD PRESSURE: 70 MMHG

## 2018-05-22 VITALS — HEIGHT: 67 IN | BODY MASS INDEX: 23.16 KG/M2 | WEIGHT: 147.56 LBS

## 2018-05-22 DIAGNOSIS — Z88.0: ICD-10-CM

## 2018-05-22 DIAGNOSIS — Z90.49: ICD-10-CM

## 2018-05-22 DIAGNOSIS — I10: ICD-10-CM

## 2018-05-22 DIAGNOSIS — E10.649: Primary | ICD-10-CM

## 2018-05-22 DIAGNOSIS — Z86.73: ICD-10-CM

## 2018-05-22 LAB
ABSOLUTE BASOPHILS: 0 THOU/UL (ref 0–0.2)
ABSOLUTE EOSINOPHILS: 0.2 THOU/UL (ref 0–0.7)
ABSOLUTE MONOCYTES: 0.6 THOU/UL (ref 0–1.2)
ALBUMIN SERPL-MCNC: 3.6 G/DL (ref 3.4–5)
ALP SERPL-CCNC: 94 U/L (ref 46–116)
ALT SERPL-CCNC: 23 U/L (ref 30–65)
ANION GAP SERPL CALC-SCNC: 5 MMOL/L (ref 7–16)
AST SERPL-CCNC: 17 U/L (ref 15–37)
BASOPHILS NFR BLD AUTO: 0.6 %
BILIRUB SERPL-MCNC: 0.5 MG/DL
BUN SERPL-MCNC: 22 MG/DL (ref 7–18)
CALCIUM SERPL-MCNC: 8.9 MG/DL (ref 8.5–10.1)
CHLORIDE SERPL-SCNC: 109 MMOL/L (ref 98–107)
CO2 SERPL-SCNC: 28 MMOL/L (ref 21–32)
CREAT SERPL-MCNC: 1.5 MG/DL (ref 0.6–1.3)
EOSINOPHIL NFR BLD: 2.5 %
GLUCOSE SERPL-MCNC: 151 MG/DL (ref 70–99)
GRANULOCYTES NFR BLD MANUAL: 58.7 %
HCT VFR BLD CALC: 43 % (ref 42–52)
HGB BLD-MCNC: 14.3 GM/DL (ref 14–18)
LYMPHOCYTES # BLD: 1.9 THOU/UL (ref 0.8–5.3)
LYMPHOCYTES NFR BLD AUTO: 29.3 %
MCH RBC QN AUTO: 29.2 PG (ref 26–34)
MCHC RBC AUTO-ENTMCNC: 33.1 G/DL (ref 28–37)
MCV RBC: 88 FL (ref 80–100)
MONOCYTES NFR BLD: 8.9 %
MPV: 8.7 FL. (ref 7.2–11.1)
NEUTROPHILS # BLD: 3.9 THOU/UL (ref 1.6–8.1)
NUCLEATED RBCS: 0 /100WBC
PLATELET COUNT*: 181 THOU/UL (ref 150–400)
POTASSIUM SERPL-SCNC: 4.6 MMOL/L (ref 3.5–5.1)
PROT SERPL-MCNC: 7.1 G/DL (ref 6.4–8.2)
RBC # BLD AUTO: 4.89 MIL/UL (ref 4.5–6)
RDW-CV: 13.3 % (ref 10.5–14.5)
SODIUM SERPL-SCNC: 142 MMOL/L (ref 136–145)
TROPONIN-I LEVEL: <0.06 NG/ML (ref ?–0.06)
WBC # BLD AUTO: 6.6 THOU/UL (ref 4–11)

## 2018-05-23 NOTE — EKG
[place] [address]
Phone:  [phone]                     ELECTROCARDIOGRAM REPORT      
_______________________________________________________________________________
 
Name:       ELVIRA PICKARD                  Room:                      [facility]#:  [id number]      Account #:      [id number]  
Admission:  18     Attend Phys:                         
Discharge:  18     Date of Birth:  [birth year]  
         Report #: [id number]
    [id number]
_______________________________________________________________________________
THIS REPORT FOR:  //name//                      
 
                         [facility] ED
                                       
Test Date:    2018               Test Time:    16:15:21
Pat Name:     ELVIRA PICKARD              Department:   
Patient ID:   [id number]            Room:          
Gender:                               Technician:   DAVID VILLEGAS
:          1940               Requested By: Yessy Block
Order Number: [id number] MD:   Irving Webber
                                 Measurements
Intervals                              Axis          
Rate:         68                       P:            50
WI:           205                      QRS:          -22
QRSD:         98                       T:            25
QT:           403                                    
QTc:          429                                    
                           Interpretive Statements
Sinus rhythm
Borderline left axis deviation
Anteroseptal infarct possible, age indeterminate
No previous ECG available for comparison
 
Electronically Signed On 2018 16:19:03 CDT by Irving Webber
[email or web address]
 
 
 
 
 
 
 
 
 
 
 
 
 
 
 
 
 
 
  <ELECTRONICALLY SIGNED>
                                           By: Irving Webber MD, [facility]     
  18     1619
D: 185   _____________________________________
T: 18   Irving Webber MD, FACC       /EPI

## 2018-08-06 ENCOUNTER — HOSPITAL ENCOUNTER (EMERGENCY)
Dept: HOSPITAL 96 - M.ERS | Age: 78
LOS: 1 days | Discharge: HOME | End: 2018-08-07
Payer: OTHER GOVERNMENT

## 2018-08-06 VITALS — BODY MASS INDEX: 32.83 KG/M2 | WEIGHT: 173.9 LBS | HEIGHT: 61 IN

## 2018-08-06 DIAGNOSIS — Z88.0: ICD-10-CM

## 2018-08-06 DIAGNOSIS — K50.90: ICD-10-CM

## 2018-08-06 DIAGNOSIS — Z90.49: ICD-10-CM

## 2018-08-06 DIAGNOSIS — F03.90: ICD-10-CM

## 2018-08-06 DIAGNOSIS — E10.649: Primary | ICD-10-CM

## 2018-08-06 DIAGNOSIS — I10: ICD-10-CM

## 2018-08-06 LAB
ABSOLUTE BASOPHILS: 0 THOU/UL (ref 0–0.2)
ABSOLUTE EOSINOPHILS: 0.2 THOU/UL (ref 0–0.7)
ABSOLUTE MONOCYTES: 0.8 THOU/UL (ref 0–1.2)
ALBUMIN SERPL-MCNC: 3.6 G/DL (ref 3.4–5)
ALP SERPL-CCNC: 99 U/L (ref 46–116)
ALT SERPL-CCNC: 22 U/L (ref 30–65)
ANION GAP SERPL CALC-SCNC: 9 MMOL/L (ref 7–16)
AST SERPL-CCNC: 22 U/L (ref 15–37)
BASOPHILS NFR BLD AUTO: 0.5 %
BILIRUB SERPL-MCNC: 0.6 MG/DL
BUN SERPL-MCNC: 28 MG/DL (ref 7–18)
CALCIUM SERPL-MCNC: 8.7 MG/DL (ref 8.5–10.1)
CHLORIDE SERPL-SCNC: 107 MMOL/L (ref 98–107)
CO2 SERPL-SCNC: 23 MMOL/L (ref 21–32)
CREAT SERPL-MCNC: 2 MG/DL (ref 0.6–1.3)
EOSINOPHIL NFR BLD: 2.3 %
GLUCOSE SERPL-MCNC: 140 MG/DL (ref 70–99)
GRANULOCYTES NFR BLD MANUAL: 72.9 %
HCT VFR BLD CALC: 42.8 % (ref 42–52)
HGB BLD-MCNC: 14 GM/DL (ref 14–18)
LYMPHOCYTES # BLD: 1.5 THOU/UL (ref 0.8–5.3)
LYMPHOCYTES NFR BLD AUTO: 15.5 %
MCH RBC QN AUTO: 29.5 PG (ref 26–34)
MCHC RBC AUTO-ENTMCNC: 32.8 G/DL (ref 28–37)
MCV RBC: 89.8 FL (ref 80–100)
MONOCYTES NFR BLD: 8.8 %
MPV: 8.6 FL. (ref 7.2–11.1)
NEUTROPHILS # BLD: 6.9 THOU/UL (ref 1.6–8.1)
NUCLEATED RBCS: 0 /100WBC
PLATELET COUNT*: 188 THOU/UL (ref 150–400)
POTASSIUM SERPL-SCNC: 4 MMOL/L (ref 3.5–5.1)
PROT SERPL-MCNC: 7.3 G/DL (ref 6.4–8.2)
RBC # BLD AUTO: 4.76 MIL/UL (ref 4.5–6)
RDW-CV: 13.3 % (ref 10.5–14.5)
SODIUM SERPL-SCNC: 139 MMOL/L (ref 136–145)
WBC # BLD AUTO: 9.4 THOU/UL (ref 4–11)

## 2018-08-07 VITALS — SYSTOLIC BLOOD PRESSURE: 125 MMHG | DIASTOLIC BLOOD PRESSURE: 60 MMHG

## 2018-08-07 LAB
BILIRUB UR-MCNC: NEGATIVE MG/DL
COLOR UR: YELLOW
KETONES UR STRIP-MCNC: NEGATIVE MG/DL
PROT UR QL STRIP: NEGATIVE
RBC # UR STRIP: NEGATIVE /UL
SP GR UR STRIP: 1.02 (ref 1–1.03)
URINE CLARITY: CLEAR
URINE GLUCOSE-RANDOM: NEGATIVE
URINE LEUKOCYTES-REFLEX: NEGATIVE
URINE NITRITE-REFLEX: NEGATIVE
UROBILINOGEN UR STRIP-ACNC: 0.2 E.U./DL (ref 0.2–1)

## 2018-09-11 ENCOUNTER — HOSPITAL ENCOUNTER (INPATIENT)
Dept: HOSPITAL 96 - M.ERS | Age: 78
LOS: 3 days | Discharge: HOME HEALTH | DRG: 69 | End: 2018-09-14
Attending: INTERNAL MEDICINE | Admitting: INTERNAL MEDICINE
Payer: OTHER GOVERNMENT

## 2018-09-11 VITALS — BODY MASS INDEX: 26.91 KG/M2 | HEIGHT: 64.02 IN | WEIGHT: 157.6 LBS

## 2018-09-11 VITALS — DIASTOLIC BLOOD PRESSURE: 71 MMHG | SYSTOLIC BLOOD PRESSURE: 155 MMHG

## 2018-09-11 VITALS — SYSTOLIC BLOOD PRESSURE: 120 MMHG | DIASTOLIC BLOOD PRESSURE: 54 MMHG

## 2018-09-11 VITALS — DIASTOLIC BLOOD PRESSURE: 85 MMHG | SYSTOLIC BLOOD PRESSURE: 144 MMHG

## 2018-09-11 DIAGNOSIS — Z86.73: ICD-10-CM

## 2018-09-11 DIAGNOSIS — Z79.82: ICD-10-CM

## 2018-09-11 DIAGNOSIS — E10.9: ICD-10-CM

## 2018-09-11 DIAGNOSIS — N18.3: ICD-10-CM

## 2018-09-11 DIAGNOSIS — Z79.899: ICD-10-CM

## 2018-09-11 DIAGNOSIS — G93.40: ICD-10-CM

## 2018-09-11 DIAGNOSIS — I12.9: ICD-10-CM

## 2018-09-11 DIAGNOSIS — Z79.4: ICD-10-CM

## 2018-09-11 DIAGNOSIS — G45.9: Primary | ICD-10-CM

## 2018-09-11 DIAGNOSIS — E10.22: ICD-10-CM

## 2018-09-11 DIAGNOSIS — F03.90: ICD-10-CM

## 2018-09-11 DIAGNOSIS — N17.9: ICD-10-CM

## 2018-09-11 DIAGNOSIS — Z90.49: ICD-10-CM

## 2018-09-11 DIAGNOSIS — Z88.0: ICD-10-CM

## 2018-09-11 DIAGNOSIS — K50.90: ICD-10-CM

## 2018-09-11 LAB
ABSOLUTE EOSINOPHILS: 0.8 THOU/UL (ref 0–0.7)
ABSOLUTE MONOCYTES: 0.9 THOU/UL (ref 0–1.2)
ALBUMIN SERPL-MCNC: 3.7 G/DL (ref 3.4–5)
ALP SERPL-CCNC: 101 U/L (ref 46–116)
ALT SERPL-CCNC: 23 U/L (ref 30–65)
ANION GAP BLD CALC-SCNC: 17 MMOL/L (ref 10–20)
ANION GAP SERPL CALC-SCNC: 10 MMOL/L (ref 7–16)
APTT BLD: 23.2 SECONDS (ref 25–31.3)
AST SERPL-CCNC: 22 U/L (ref 15–37)
BILIRUB SERPL-MCNC: 0.5 MG/DL
BILIRUB UR-MCNC: NEGATIVE MG/DL
BUN BLD-MCNC: 29 MG/DL (ref 8–26)
BUN SERPL-MCNC: 27 MG/DL (ref 7–18)
CALCIUM SERPL-MCNC: 8.6 MG/DL (ref 8.5–10.1)
CHLORIDE BLD-SCNC: 107 MMOL/L (ref 98–109)
CHLORIDE SERPL-SCNC: 106 MMOL/L (ref 98–107)
CO2 SERPL-SCNC: 24 MMOL/L (ref 24–29)
CO2 SERPL-SCNC: 25 MMOL/L (ref 21–32)
COLOR UR: YELLOW
CREAT SERPL-MCNC: 1.7 MG/DL (ref 0.6–1.3)
CREAT SERPL-MCNC: 1.8 MG/DL (ref 0.6–1.3)
EOSINOPHIL NFR BLD: 11 %
FIBRINOGEN PPP-MCNC: 374 MG/DL (ref 200–340)
GLUCOSE BLD-MCNC: 191 MG/DL (ref 70–105)
GLUCOSE SERPL-MCNC: 190 MG/DL (ref 70–99)
GRANULOCYTES NFR BLD MANUAL: 39 %
HCT VFR BLD CALC: 41.4 % (ref 42–52)
HGB BLD-MCNC: 13.6 GM/DL (ref 14–18)
HGB BLD-MCNC: 14.3 G/DL (ref 12–17)
INR PPP: 1.1
KETONES UR STRIP-MCNC: NEGATIVE MG/DL
LYMPHOCYTES # BLD: 2.6 THOU/UL (ref 0.8–5.3)
LYMPHOCYTES NFR BLD AUTO: 37 %
MCH RBC QN AUTO: 29.4 PG (ref 26–34)
MCHC RBC AUTO-ENTMCNC: 33 G/DL (ref 28–37)
MCV RBC: 89.1 FL (ref 80–100)
MONOCYTES NFR BLD: 13 %
MPV: 9.3 FL. (ref 7.2–11.1)
NEUTROPHILS # BLD: 2.7 THOU/UL (ref 1.6–8.1)
NITRITE UR QL STRIP: NEGATIVE
NUCLEATED RBCS: 0 /100WBC
PLATELET # BLD EST: ADEQUATE 10*3/UL
PLATELET CLUMP BLD QL SMEAR: (no result)
PLATELET COUNT*: 220 THOU/UL (ref 150–400)
POC CA IONIZED: 4.7 MG/DL (ref 4.5–5.3)
POC HEMATOCRIT: 42 %PCV (ref 38–51)
POTASSIUM SERPL-SCNC: 3.9 MMOL/L (ref 3.5–4.9)
POTASSIUM SERPL-SCNC: 3.9 MMOL/L (ref 3.5–5.1)
PROT SERPL-MCNC: 7.3 G/DL (ref 6.4–8.2)
PROT UR QL STRIP: NEGATIVE
PROTHROMBIN TIME: 11.1 SECONDS (ref 9.2–11.5)
RBC # BLD AUTO: 4.64 MIL/UL (ref 4.5–6)
RBC # UR STRIP: NEGATIVE /UL
RBC MORPH BLD: NORMAL
RDW-CV: 13.6 % (ref 10.5–14.5)
SODIUM SERPL-SCNC: 141 MMOL/L (ref 136–145)
SODIUM SERPL-SCNC: 142 MMOL/L (ref 138–146)
SP GR UR STRIP: 1.01 (ref 1–1.03)
URINE CLARITY: CLEAR
URINE GLUCOSE-RANDOM: NEGATIVE
URINE LEUKOCYTES: NEGATIVE
UROBILINOGEN UR STRIP-ACNC: 0.2 E.U./DL (ref 0.2–1)
WBC # BLD AUTO: 7 THOU/UL (ref 4–11)

## 2018-09-12 VITALS — DIASTOLIC BLOOD PRESSURE: 58 MMHG | SYSTOLIC BLOOD PRESSURE: 134 MMHG

## 2018-09-12 VITALS — DIASTOLIC BLOOD PRESSURE: 81 MMHG | SYSTOLIC BLOOD PRESSURE: 141 MMHG

## 2018-09-12 VITALS — DIASTOLIC BLOOD PRESSURE: 54 MMHG | SYSTOLIC BLOOD PRESSURE: 107 MMHG

## 2018-09-12 VITALS — SYSTOLIC BLOOD PRESSURE: 152 MMHG | DIASTOLIC BLOOD PRESSURE: 63 MMHG

## 2018-09-12 VITALS — SYSTOLIC BLOOD PRESSURE: 139 MMHG | DIASTOLIC BLOOD PRESSURE: 73 MMHG

## 2018-09-12 LAB
ALBUMIN SERPL-MCNC: 3.4 G/DL (ref 3.4–5)
ALP SERPL-CCNC: 95 U/L (ref 46–116)
ALT SERPL-CCNC: 24 U/L (ref 30–65)
ANION GAP SERPL CALC-SCNC: 5 MMOL/L (ref 7–16)
AST SERPL-CCNC: 23 U/L (ref 15–37)
BILIRUB SERPL-MCNC: 0.5 MG/DL
BUN SERPL-MCNC: 21 MG/DL (ref 7–18)
CALCIUM SERPL-MCNC: 8.4 MG/DL (ref 8.5–10.1)
CHLORIDE SERPL-SCNC: 109 MMOL/L (ref 98–107)
CO2 SERPL-SCNC: 29 MMOL/L (ref 21–32)
CREAT SERPL-MCNC: 1.5 MG/DL (ref 0.6–1.3)
GLUCOSE SERPL-MCNC: 184 MG/DL (ref 70–99)
POTASSIUM SERPL-SCNC: 4 MMOL/L (ref 3.5–5.1)
PROT SERPL-MCNC: 7 G/DL (ref 6.4–8.2)
SODIUM SERPL-SCNC: 143 MMOL/L (ref 136–145)

## 2018-09-12 NOTE — EKG
[place] [address]
Phone:  [phone]                     ELECTROCARDIOGRAM REPORT      
_______________________________________________________________________________
 
Name:       ELVIRA PICKARD                  Room:           [address]    ADM IN  
[facility].#:  [id number]      Account #:      [id number]  
Admission:  18     Attend Phys:    Spring Denise MD 
Discharge:               Date of Birth:  [birth year]  
         Report #: [id number]
    [id number]
_______________________________________________________________________________
THIS REPORT FOR:  //name//                      
 
                         [facility] ED
                                       
Test Date:    2018               Test Time:    20:11:55
Pat Name:     ELVIRA PICKARD              Department:   
Patient ID:   [id number]            Room:         [facility]
Gender:       M                        Technician:   JUDY
:          1940               Requested By: Tal Harmon
Order Number: [id number] MD:   Andre Lewis
                                 Measurements
Intervals                              Axis          
Rate:         76                       P:            41
MA:           211                      QRS:          -30
QRSD:         105                      T:            25
QT:           382                                    
QTc:          430                                    
                           Interpretive Statements
Sinus rhythm
Left axis deviation
Compared to ECG 2018 16:15:21
Myocardial infarct finding no longer present
 
Electronically Signed On 2018 10:05:09 CDT by Andre Lewis
[email or web address]
 
 
 
 
 
 
 
 
 
 
 
 
 
 
 
 
 
 
  <ELECTRONICALLY SIGNED>
                                           By: Andre Lewis MD, [facility]      
  18     1005
D: 18   _____________________________________
T: 18   Andre Lewis MD, [facility]        /EPI

## 2018-09-12 NOTE — 2DMMODE
[place] [address]
Phone:  [phone] 2 D/M-MODE ECHOCARDIOGRAM     
_______________________________________________________________________________
 
Name:         ELVIRA PICKARD                 Room:          [address] [place] 
[facility]#:    [id number]     Account #:     [id number]  
Admission:    18    Attend Phys:   Spring Denise, 
Discharge:                Date of Birth: [birth year]  
Date of Service: 18 1625  Report #:      [id number]
        [id number]
_______________________________________________________________________________
THIS REPORT FOR:  //name//                      
 
 
--------------- APPROVED REPORT --------------
 
 
Study performed:  2018 13:37:46
 
EXAM: Comprehensive 2D, Doppler, and color-flow 
Echocardiogram 
Patient Location: In-Patient   
Room #:  [facility]     Status:  routine
 
      BSA:         1.81
HR: 68 bpm BP:          152/63 mmHg 
Rhythm: NSR     
 
Other Information 
Study Quality: Good
 
Indications
CVA/TIA 
 
Echo Enhancing Agent
Indication: Rule out Shunt
Agent(s) / Amount(s) Used: Agitated Saline 10 cc
 
2D Dimensions
   LVEF(%):  76.26 (&gt;50%)
IVSd:  11.11 (7-11mm) LVOT Diam:  20.28 (18-24mm) 
LVDd:  47.25 mm  
PWd:  11.35 (7-11mm) Ascending Ao:  30.46 (22-36mm)
LVDs:  26.00 (25-40mm) 
Aortic Root:  31.98 mm 
   Cunningham's LVEF:  76.26 %
 
Volumes
Left Atrial Volume (Systole) 
    LA ESV Index:  22.60 mL/m2
 
Aortic Valve
AoV Peak Chetan.:  1.36 m/s 
AO Peak Gr.:  7.41 mmHg  LVOT Max PG:  3.00 mmHg
AO Mean Gr.:  4.32 mmHg  LVOT Mean P.39 mmHg
    LVOT Max V:  0.87 m/s
AO V2 VTI:  29.46 cm  LVOT Mean V:  0.54 m/s
 
 
[place] [address]
Phone:  [phone]                     2 D/M-MODE ECHOCARDIOGRAM     
_______________________________________________________________________________
 
Name:         ELVIRA PICKARD                 Room:          [address] [place] 
[facility]#:    [id number]     Account #:     [id number]  
Admission:    18    Attend Phys:   Spring Denise, 
Discharge:                Date of Birth: [birth year]  
Date of Service: 18 1625  Report #:      [id number]
        [id number]
_______________________________________________________________________________
LORRAINE (VTI):  2.02 cm2  LVOT V1 VTI:  18.44 cm
 
Mitral Valve
    E/A Ratio:  1.20
    MV Decel. Time:  176.12 ms
MV E Max Chetan.:  0.96 m/s 
MV PHT:  51.07 ms  
MVA (PHT):  4.31 cm2  
 
TDI
E/Lateral E':  12.00 E/Medial E':  8.73
   Medial E' Chetan.:  0.11 m/s
   Lateral E' Chetan.:  0.08 m/s
 
Pulmonary Valve
PV Peak Chetan.:  0.87 m/s PV Peak Gr.:  3.03 mmHg
 
Tricuspid Valve
    RAP Estimate:  5.00 mmHg
TR Peak Gr.:  20.01 mmHg RVSP:  25.00 mmHg
    PA Pressure:  25.00 mmHg
 
Left Ventricle
The left ventricle is normal size. There is normal LV segmental wall 
motion. Mild concentric left ventricular hypertrophy. Left 
ventricular systolic function is normal. The left ventricular 
ejection fraction is within the normal range. LVEF is 60-65%. The 
left ventricular diastolic function is normal.
 
Right Ventricle
The right ventricle is normal size. The right ventricular systolic 
function is normal.
 
Atria
The left atrium size is normal. Interatrial septum is intact without 
evidence of ASD or PFO. The right atrium size is normal.
 
Aortic Valve
The aortic valve is normal in structure. No aortic regurgitation is 
present. There is no aortic valvular stenosis.
 
Mitral Valve
The mitral valve is normal in structure. Trace mitral regurgitation. 
No evidence of mitral valve stenosis.
 
Tricuspid Valve
 
 
[place] [address]
Phone:  [phone]                     2 D/M-MODE ECHOCARDIOGRAM     
_______________________________________________________________________________
 
Name:         MELLYELVIRA CORTEZ                 Room:          [address] [place] 
.R.#:    [id number]     Account #:     [id number]  
Admission:    18    Attend Phys:   Spring Denise, 
Discharge:                Date of Birth: [birth year]  
Date of Service: 18 1625  Report #:      [id number]
        [id number]
_______________________________________________________________________________
The tricuspid valve is normal in structure. Trace tricuspid 
regurgitation. No pulmonary hypertension.
 
Pulmonic Valve
The pulmonary valve is normal in structure. There is no pulmonic 
valvular regurgitation.
 
Great Vessels
The aortic root is normal in size. IVC is normal in size and 
collapses with &gt;50% inspiration
 
Pericardium
There is no pericardial effusion.
 
&lt;Conclusion&gt;
Mild concentric left ventricular hypertrophy.
LVEF is 60-65%.
Interatrial septum is intact without evidence of ASD or PFO.
 
 
 
 
 
 
 
 
 
 
 
 
 
 
 
 
 
 
 
 
 
 
 
 
 
 
  <ELECTRONICALLY SIGNED>
                                           By: Andre Lewis MD, FACC      
  18     1625
D: 18 1625   _____________________________________
T: 18 1625   Andre Lewis MD, FACC        /INF

## 2018-09-13 VITALS — DIASTOLIC BLOOD PRESSURE: 83 MMHG | SYSTOLIC BLOOD PRESSURE: 137 MMHG

## 2018-09-13 VITALS — SYSTOLIC BLOOD PRESSURE: 144 MMHG | DIASTOLIC BLOOD PRESSURE: 77 MMHG

## 2018-09-13 VITALS — SYSTOLIC BLOOD PRESSURE: 141 MMHG | DIASTOLIC BLOOD PRESSURE: 74 MMHG

## 2018-09-13 VITALS — DIASTOLIC BLOOD PRESSURE: 83 MMHG | SYSTOLIC BLOOD PRESSURE: 146 MMHG

## 2018-09-13 VITALS — SYSTOLIC BLOOD PRESSURE: 108 MMHG | DIASTOLIC BLOOD PRESSURE: 56 MMHG

## 2018-09-13 VITALS — DIASTOLIC BLOOD PRESSURE: 75 MMHG | SYSTOLIC BLOOD PRESSURE: 134 MMHG

## 2018-09-13 LAB
ABSOLUTE BASOPHILS: 0.1 THOU/UL (ref 0–0.2)
ABSOLUTE EOSINOPHILS: 0.6 THOU/UL (ref 0–0.7)
ABSOLUTE MONOCYTES: 0.9 THOU/UL (ref 0–1.2)
ANION GAP SERPL CALC-SCNC: 11 MMOL/L (ref 7–16)
BASOPHILS NFR BLD AUTO: 0.7 %
BUN SERPL-MCNC: 17 MG/DL (ref 7–18)
CALCIUM SERPL-MCNC: 9.1 MG/DL (ref 8.5–10.1)
CHLORIDE SERPL-SCNC: 111 MMOL/L (ref 98–107)
CHOLEST SERPL-MCNC: 99 MG/DL (ref ?–200)
CO2 SERPL-SCNC: 24 MMOL/L (ref 21–32)
CREAT SERPL-MCNC: 1.5 MG/DL (ref 0.6–1.3)
EOSINOPHIL NFR BLD: 7.4 %
EST. AVERAGE GLUCOSE BLD GHB EST-MCNC: 183 MG/DL
GLUCOSE SERPL-MCNC: 139 MG/DL (ref 70–99)
GLYCOHEMOGLOBIN (HGB A1C): 8 % (ref 4.8–5.6)
GRANULOCYTES NFR BLD MANUAL: 55.8 %
HCT VFR BLD CALC: 41.4 % (ref 42–52)
HDLC SERPL-MCNC: 31 MG/DL (ref 40–?)
HGB BLD-MCNC: 13.5 GM/DL (ref 14–18)
LDLC SERPL-MCNC: 32 MG/DL (ref ?–100)
LYMPHOCYTES # BLD: 2 THOU/UL (ref 0.8–5.3)
LYMPHOCYTES NFR BLD AUTO: 24.7 %
MCH RBC QN AUTO: 29.3 PG (ref 26–34)
MCHC RBC AUTO-ENTMCNC: 32.7 G/DL (ref 28–37)
MCV RBC: 89.7 FL (ref 80–100)
MONOCYTES NFR BLD: 11.4 %
MPV: 9 FL. (ref 7.2–11.1)
NEUTROPHILS # BLD: 4.5 THOU/UL (ref 1.6–8.1)
NUCLEATED RBCS: 0 /100WBC
PLATELET COUNT*: 205 THOU/UL (ref 150–400)
POTASSIUM SERPL-SCNC: 4.3 MMOL/L (ref 3.5–5.1)
RBC # BLD AUTO: 4.61 MIL/UL (ref 4.5–6)
RDW-CV: 13.7 % (ref 10.5–14.5)
SODIUM SERPL-SCNC: 146 MMOL/L (ref 136–145)
TC:HDL: 3.2 RATIO
TRIGL SERPL-MCNC: 180 MG/DL (ref ?–150)
VLDLC SERPL CALC-MCNC: 36 MG/DL (ref ?–40)
WBC # BLD AUTO: 8.1 THOU/UL (ref 4–11)

## 2018-09-14 VITALS — DIASTOLIC BLOOD PRESSURE: 71 MMHG | SYSTOLIC BLOOD PRESSURE: 117 MMHG

## 2018-09-14 VITALS — DIASTOLIC BLOOD PRESSURE: 73 MMHG | SYSTOLIC BLOOD PRESSURE: 130 MMHG

## 2018-09-14 VITALS — SYSTOLIC BLOOD PRESSURE: 130 MMHG | DIASTOLIC BLOOD PRESSURE: 73 MMHG

## 2018-09-14 VITALS — SYSTOLIC BLOOD PRESSURE: 149 MMHG | DIASTOLIC BLOOD PRESSURE: 76 MMHG

## 2018-09-21 NOTE — CON
[address]  [address]                    CONSULTATION                  
_______________________________________________________________________________
 
Name:       ELVIRA PICKARD                  Room:           [address] [place]  
M.R.#:  [id number]      Account #:      [id number]  
Admission:  09/11/18     Attend Phys:    Spring Denise MD 
Discharge:  09/14/18     Date of Birth:  [birth year]  
         Report #: [id number]
                                                                     [id number]  
_______________________________________________________________________________
THIS REPORT FOR:  //name//                      
 
CC: FAM unknown
    Spring Denise
 
DATE OF SERVICE:  09/12/2018
 
 
HISTORY OF PRESENT ILLNESS:  This is a 78-year-old male patient who is unable to
provide any reliable history.  This patient has memory deficit, and he cannot
provide any history.  The family is here, but the history is confusing even
after talking to the family because they are not very good historian either. 
This patient had strokes.  [facility] to [facility]  They
do not know when he [facility].  They do not know how the stroke
affected him; however, he has pretty significant visual deficit.  It is not even
clear what [facility]  He was admitted with what
looks like TIA-like symptoms.  They indicated that the patient became weak in
the right upper and right lower extremity.  In fact, for about 3-4 minutes, he
was not able to lift it at all.  After that, his speech got affected, then that
speech problem lasted for a few minutes.  Then, he became better.  No
tonic-clonic activity was noticed.
 
REVIEW OF SYSTEMS:  Very incomplete because the history is extremely poor.  He
is a diabetic.  It is not clear what his blood sugar run.  I do not know what
his blood sugar was when this episode actually happened.  His blood sugar here
has been okay, and he really did not have any hypoglycemia.  Family thinks he is
back to his baseline.  He is on Keppra, but they do not know why he is on
Keppra.  He is on trazodone, but they do not know why he is on trazodone.  He is
on Lipitor.  They do not know the status of the patient's hypercalcemia.  All of
it makes the history taking very difficult.  He does have a history of Crohn
disease, dementia.  He has some sundowning.  He had a colon resection in the
past.  He had diverticulitis.  He had multiple CVAs in the past.  This is a
relevant 14-point review of system.  He cannot tell me much else that whether he
is having any other symptoms.  From clinical examination, it does not look like
he has any new ENT, cardiac, respiratory, GI, , musculoskeletal,
constitutional, dermatological, hematological, psychiatric, throat or allergic
symptom associated with present symptomatology.
 
PAST MEDICAL HISTORY:  Positive for multiple strokes in the past the description
of which is not clear.
 
FAMILY HISTORY:  Negative for early age stroke.
 
SOCIAL HISTORY:  Lives with the family, somebody is there with him all the time,
they gave him the medication.
 
 
 
 
[place] [address]                    CONSULTATION                  
_______________________________________________________________________________
 
Name:       ELVIRA PICKARD                  Room:           [address]#:  [id number]      Account #:      [id number]  
Admission:  09/11/18     Attend Phys:    Spring Denise MD 
Discharge:  09/14/18     Date of Birth:  [birth year]  
         Report #: [id number]
                                                                     [id number]  
_______________________________________________________________________________
PHYSICAL EXAMINATION:  Indicate he does not know what month it is, he knows,
which hospital he is on.  He could not name the president.  His speech looks
intact.  Cranial nerve examination 2-12 indicates marked visual difficulty, but
I cannot localize to any particular visual field.  He does have a right facial
palsy.  He is slightly weak on the right upper and right lower extremities.  He
could not understand the instruction for position sense and got it all wrong. 
His reflexes are absent in the lower extremities.  There is no asymmetry of the
tone.  There is no cerebellar sign.  He did not cooperate with the fundus
examination.  His cardiac is unremarkable.  No respiratory difficulty or rhonchi
was noticed.  His pulses are difficult to feel.  He has no edema, cyanosis or
jaundice.  His blood pressure is 152/63, respirations 16, pulse is 78,
temperature is 98.1.
 
LABORATORY DATA:  His white count is 7.  He did have a CT scan, which showed old
stroke.  Carotid Doppler was unremarkable.
 
IMPRESSION:  This patient's clinical presentation is consistent with transient
ischemic attack.  I cannot exclude the stroke, and I cannot exclude the
seizures.  He needs further workup.  Our MRI machine is broken.  His BUN and
creatinine is abnormal, and I do not want to do CT angio on him.  I discussed
with the patient and the family pros and cons of getting a workup done as an
outpatient or waiting for the MRI machine is fixed.
 
RECOMMENDATIONS:
1.  MRI of the brain.
2.  MRA of the head.
3.  If it shows any acute stroke, then we may have to change.
4.  I do not know what antiplatelet he was on, so we will check his lipid
profile, and continue his aspirin.  All of it was discussed with the family, and
they want to stay here and they understand the pros and cons of it.
 
Thank you very much for this referral.
 
 
 
 
 
 
 
 
 
 
 
 
<ELECTRONICALLY SIGNED>
                                        By:  Arben Dye MD             
09/21/18     0932
D: 09/12/18 1201_______________________________________
T: 09/12/18 1812Psegundo Dye MD                /nt

## 2018-11-15 ENCOUNTER — HOSPITAL ENCOUNTER (INPATIENT)
Dept: HOSPITAL 96 - M.ERS | Age: 78
LOS: 1 days | Discharge: HOME HEALTH | DRG: 392 | End: 2018-11-16
Attending: INTERNAL MEDICINE | Admitting: INTERNAL MEDICINE
Payer: OTHER GOVERNMENT

## 2018-11-15 VITALS — SYSTOLIC BLOOD PRESSURE: 136 MMHG | DIASTOLIC BLOOD PRESSURE: 59 MMHG

## 2018-11-15 VITALS — SYSTOLIC BLOOD PRESSURE: 137 MMHG | DIASTOLIC BLOOD PRESSURE: 74 MMHG

## 2018-11-15 VITALS — WEIGHT: 163 LBS | BODY MASS INDEX: 27.16 KG/M2 | HEIGHT: 65 IN

## 2018-11-15 VITALS — DIASTOLIC BLOOD PRESSURE: 68 MMHG | SYSTOLIC BLOOD PRESSURE: 146 MMHG

## 2018-11-15 VITALS — DIASTOLIC BLOOD PRESSURE: 59 MMHG | SYSTOLIC BLOOD PRESSURE: 121 MMHG

## 2018-11-15 DIAGNOSIS — N18.3: ICD-10-CM

## 2018-11-15 DIAGNOSIS — Z88.0: ICD-10-CM

## 2018-11-15 DIAGNOSIS — E10.22: ICD-10-CM

## 2018-11-15 DIAGNOSIS — K21.9: Primary | ICD-10-CM

## 2018-11-15 DIAGNOSIS — F03.90: ICD-10-CM

## 2018-11-15 DIAGNOSIS — Z90.49: ICD-10-CM

## 2018-11-15 DIAGNOSIS — I25.110: ICD-10-CM

## 2018-11-15 DIAGNOSIS — K20.9: ICD-10-CM

## 2018-11-15 DIAGNOSIS — I12.9: ICD-10-CM

## 2018-11-15 DIAGNOSIS — K27.9: ICD-10-CM

## 2018-11-15 DIAGNOSIS — E10.51: ICD-10-CM

## 2018-11-15 DIAGNOSIS — K50.90: ICD-10-CM

## 2018-11-15 DIAGNOSIS — E78.5: ICD-10-CM

## 2018-11-15 DIAGNOSIS — Z86.73: ICD-10-CM

## 2018-11-15 LAB
ABSOLUTE BASOPHILS: 0.1 THOU/UL (ref 0–0.2)
ABSOLUTE EOSINOPHILS: 0.2 THOU/UL (ref 0–0.7)
ABSOLUTE MONOCYTES: 0.9 THOU/UL (ref 0–1.2)
ALBUMIN SERPL-MCNC: 3.7 G/DL (ref 3.4–5)
ALP SERPL-CCNC: 91 U/L (ref 46–116)
ALT SERPL-CCNC: 49 U/L (ref 30–65)
ANION GAP SERPL CALC-SCNC: 8 MMOL/L (ref 7–16)
AST SERPL-CCNC: 60 U/L (ref 15–37)
BASOPHILS NFR BLD AUTO: 0.5 %
BILIRUB SERPL-MCNC: 0.7 MG/DL
BUN SERPL-MCNC: 34 MG/DL (ref 7–18)
CALCIUM SERPL-MCNC: 8.9 MG/DL (ref 8.5–10.1)
CHLORIDE SERPL-SCNC: 106 MMOL/L (ref 98–107)
CO2 SERPL-SCNC: 28 MMOL/L (ref 21–32)
CREAT SERPL-MCNC: 2.2 MG/DL (ref 0.6–1.3)
EOSINOPHIL NFR BLD: 1.9 %
GLUCOSE SERPL-MCNC: 238 MG/DL (ref 70–99)
GRANULOCYTES NFR BLD MANUAL: 73.9 %
HCT VFR BLD CALC: 42.1 % (ref 42–52)
HGB BLD-MCNC: 13.8 GM/DL (ref 14–18)
LIPASE: 338 U/L (ref 73–393)
LYMPHOCYTES # BLD: 1.5 THOU/UL (ref 0.8–5.3)
LYMPHOCYTES NFR BLD AUTO: 15.3 %
MAGNESIUM SERPL-MCNC: 2.1 MG/DL (ref 1.8–2.4)
MCH RBC QN AUTO: 29.4 PG (ref 26–34)
MCHC RBC AUTO-ENTMCNC: 32.8 G/DL (ref 28–37)
MCV RBC: 89.6 FL (ref 80–100)
MONOCYTES NFR BLD: 8.4 %
MPV: 9 FL. (ref 7.2–11.1)
NEUTROPHILS # BLD: 7.5 THOU/UL (ref 1.6–8.1)
NT-PRO BRAIN NAT PEPTIDE: 86 PG/ML (ref ?–300)
NUCLEATED RBCS: 0 /100WBC
PLATELET COUNT*: 202 THOU/UL (ref 150–400)
POTASSIUM SERPL-SCNC: 4.3 MMOL/L (ref 3.5–5.1)
PROT SERPL-MCNC: 6.9 G/DL (ref 6.4–8.2)
RBC # BLD AUTO: 4.7 MIL/UL (ref 4.5–6)
RDW-CV: 13.4 % (ref 10.5–14.5)
SODIUM SERPL-SCNC: 142 MMOL/L (ref 136–145)
TROPONIN-I LEVEL: <0.06 NG/ML (ref ?–0.06)
WBC # BLD AUTO: 10.1 THOU/UL (ref 4–11)

## 2018-11-15 NOTE — NUR
RECEIVED REPORT FROM PARUL IN ED AND ASSUMED CARE OF PT @ 0415.PT IS A/O BUT
CONFUSED.PT HAS HX OF DEMENTIS AND SUNDOWNERS.VSS.TRACING SR ON THE
MONITOR.ASSESSMENT AS CHARTED.IV PATENT AND SALINE LOCKED.PT TO BE NPO AT
MIDNIGHT FOR CARDIOLOGY CONSULT.PT INFORMED OF PLAN OF CARE AND COMMUNICATES
UNDERSTANDING. PT IS CALM AND COOPERATIVE WITH NO C/O PAIN AT TIME OF
ASSESSMENT.PT IS UP WITH SBA ASSIST.HOURLY ROUNDING COMPLETED FOR PT SAFETY.PT
LEFT RESTING IN BED WITH CALL LIGHT AND FALL PRECAUTIONS IN PLACE.WILL
CONTINNUE TO MONITOR FOR DURATION OF SHIFT.

## 2018-11-15 NOTE — NUR
RECEIVED REPORT AND ASSUMED CARE OF PT, ASSESSMENT COMPLETED. PT CONFUSED BUT
COOPERATIVE. O2 ON PER CP PROTOCOL, NO SOB NOTED. TELEMETRY ON SHOWING SR.
WILL CONT TO MONITOR AND ASSIST AS NEEDED.

## 2018-11-15 NOTE — EKG
[place] [address]
Phone:  [phone]                     ELECTROCARDIOGRAM REPORT      
_______________________________________________________________________________
 
Name:       VIRAJ PICKARDDIE DANA                  Room:           [place] [address]    ADM IN  
[facility].#:  [id number]      Account #:      [id number]  
Admission:  11/15/18     Attend Phys:    Johnnie Munoz MD 
Discharge:               Date of Birth:  [birth year]  
         Report #: [id number]
    [id number]
_______________________________________________________________________________
THIS REPORT FOR:  //name//                      
 
                         [facility] ED
                                       
Test Date:    2018-11-15               Test Time:    14:51:42
Pat Name:     ELVIRA PICKARD              Department:   
Patient ID:   [id number]            Room:         [facility]
Gender:       M                        Technician:   BENEDICT
:          1940               Requested By: Gary Velasquez
Order Number: [id number] MD:   Tevin Chavis
                                 Measurements
Intervals                              Axis          
Rate:         82                       P:            
AL:                                    QRS:          -29
QRSD:         94                       T:            45
QT:           365                                    
QTc:          427                                    
                           Interpretive Statements
Sinus rhythm
Borderline left axis deviation
Borderline low voltage, extremity leads
Abnormal R-wave progression, early transition
Compared to ECG 2018 20:11:55
No significant changes noted
Electronically Signed On 11- 17:35:44 CST by Tevin Chavis
[email or web address]
 
 
 
 
 
 
 
 
 
 
 
 
 
 
 
 
 
  <ELECTRONICALLY SIGNED>
                                           By: Tevin Chavis MD, FACC   
  11/15/18     1735
D: 11/15/18 1451   _____________________________________
T: 11/15/18 1451   Tevin Chavis MD, FACC     /EPI

## 2018-11-15 NOTE — NUR
PT INCREASINGLY CONFUSED, PULLED OUT IV, RESTARTED WITHOUT DIFFICULTY. COBAN
APPLIED. PT OUT OF BED AND WOULD NOT RETURN. PT BECOMING AGRESSIVE AND PUSHING
STAFF. SECURITY HERE AND ASSISTED WITH GETTING HIM INTO BED. DR NAZARIO
INFORMED AND ORDERS RECEIVED.

## 2018-11-16 VITALS — DIASTOLIC BLOOD PRESSURE: 64 MMHG | SYSTOLIC BLOOD PRESSURE: 120 MMHG

## 2018-11-16 VITALS — DIASTOLIC BLOOD PRESSURE: 72 MMHG | SYSTOLIC BLOOD PRESSURE: 123 MMHG

## 2018-11-16 VITALS — DIASTOLIC BLOOD PRESSURE: 74 MMHG | SYSTOLIC BLOOD PRESSURE: 138 MMHG

## 2018-11-16 NOTE — NUR
RECIEVED REPORT. ASSUMED CARE OF PT AT 0730. VSS. CARDIAC MONITORING IN PLACE
SR. PT IS ALERT AND ORIETNED TO SELF ONLY. PT IS VERY CONFUSED AND DOES NOT
KNOW WHERE HE IS OR WHY HE IS HERE. PT IS SLIGHTLY NON-COOPERATIVE WITH
ASSESSMENT THIS AM. PT CONTINUALLY WANTS TO GET UP AND WALK AROUND PT IS
UNSTEADY ON FEET. PT NPO PENDING CARDIOLOGY CONSULT. NO FAMILY PRESNT AT THIS
TIME. PT DNEIES ANY PAIN THIS AM. CALL LIGHT IS WITHIN REACH. BED ALARM ON FOR
PT SAFETY. WILL CONTINUE TO MONITOR FOR DURATION OF SHFIT.

## 2018-11-16 NOTE — CON
[address]  [address]                    CONSULTATION                  
_______________________________________________________________________________
 
Name:       ELVIRA PICKARD                  Room:           [address] [place]  
M.R.#:  [id number]      Account #:      [id number]  
Admission:  11/15/18     Attend Phys:    Johnnie Munoz MD 
Discharge:  11/16/18     Date of Birth:  [birth year]  
         Report #: [id number]
                                                                     [id number]  
_______________________________________________________________________________
THIS REPORT FOR:  //name//                      
 
CC: MILA Munoz
    Physician staff
 
DATE OF SERVICE:  11/16/2018
 
 
HISTORY OF PRESENT ILLNESS:  The patient is a 78-year-old white male who I was
asked to see in the hospital today after he apparently complained of chest pain.
 The patient has severe dementia.  He is not oriented to place or time.  He does
not know why he is in the hospital.  Apparently, he was brought to the hospital
yesterday by his wife and dropped off.  He was admitted for further evaluation
and treatment.  There are no family members available at this time.  The patient
was actually admitted here to [place] in January of this year with chest
pain.  It was thought to represent GERD.  He did have a nuclear stress test in
January using Lexiscan.  There is normal perfusion, ejection fraction 76%.  It
was felt that his chest pain was noncardiac.  He was again admitted here to [facility] in September with confusion.  MRI showed no stroke.  He was felt to have
dementia and he was discharged.  The patient was brought to the Emergency Room
by his wife yesterday.  Apparently, he had some chest pain while waiting for a
haircut.  He had some nausea.  He was admitted for further evaluation and
treatment.
 
PAST MEDICAL HISTORY:  He has had previous hemicolectomy, hypertension,
diabetes, dementia.  He has had a previous stroke.
 
MEDICATIONS AT HOME:  Consist of amlodipine, aspirin, Lipitor, insulin, Lasix,
losartan.
 
ALLERGIES:  Intolerance to PENICILLIN.
 
FAMILY HISTORY:  Cannot be obtained.
 
SOCIAL HISTORY:  Nonsmoker, no alcohol abuse.  He is  and lives in [facility].
 
REVIEW OF SYSTEMS:  Could not be obtained.
 
PHYSICAL EXAMINATION:
GENERAL:  Revealed an elderly male, lying in bed, appeared in no distress.
VITAL SIGNS:  He had blood pressure pf 130/60, pulse 80, he is afebrile.
HEENT:  He was anicteric.  Conjunctivae pink.  Mucous membranes dry.
NECK:  Veins did not appear distended.
CHEST:  Clear to auscultation.
 
 
 
[place] [address]                    CONSULTATION                  
_______________________________________________________________________________
 
Name:       ELVIRA PICKARD DANA                  Room:           M.230-P    DIS IN  
M.R.#:  [id number]      Account #:      [id number]  
Admission:  11/15/18     Attend Phys:    Johnnie Munoz MD 
Discharge:  11/16/18     Date of Birth:  [birth year]  
         Report #: [id number]
                                                                     [id number]  
_______________________________________________________________________________
CARDIAC:  Regular rhythm without murmur.
ABDOMEN:  Soft.
EXTREMITIES:  Dorsalis pedis pulses 1+ right, could not be palpated left.
SKIN:  Cool and dry.
NEUROLOGIC:  He moved all extremities.
 
ECG showed a sinus rhythm.  There was no ST or T-wave change.
 
LABORATORY DATA:  Sodium 142; BUN 34; creatinine was 2.2, it was actually 2.4 in
January.  Glucose 238.  Liver function studies were normal.  Troponins all 0.06.
 Previously LDL was only 32.  TSH 1.4.  White blood cell count 10.1, hemoglobin
13.8.  He did have a portable chest x-ray that showed mild cardiomegaly,
otherwise clear lung fields.  He had a carotid Doppler study last month that
showed no significant stenosis.  MRI last month of the brain showed evidence of
previous basal ganglia infarction.
 
The patient did have an echocardiogram last month that showed left ventricular
hypertrophy, ejection fraction 65%.  No shunt.
 
IMPRESSION AND RECOMMENDATIONS:
1.  Chest pain.  Atypical for angina.  The patient had a negative nuclear stress
test earlier this year.  I would not recommend repeat cardiac evaluation.  I
suspect his chest pain is noncardiac.
2.  Severe dementia.  I would recommend a conservative approach.
3. Diabetes.
4.  Hypertension.  The patient has been on a calcium blocker and ARB.
5.  Hyperlipidemia.  The patient is on a statin drug.
6.  Chronic kidney disease.
 
 
 
 
 
 
 
 
 
 
 
 
 
 
 
 
<ELECTRONICALLY SIGNED>
                                        By:  Andre Lewis MD, FACC      
11/16/18     1537
D: 11/16/18 0919_______________________________________
T: 11/16/18 1200Davisreedhar Lewis MD, FACC         /nt

## 2018-11-16 NOTE — NUR
DISCHARGE ORDERS RECIEVED AND PREPARED. IV AND CARDIAC MONITORING
DISCONTINUED. PT AND PT SPOUSE EDUCATED ON DISCHARGE INSTRCUTIONS. PT'S SPOUSE
COMMUNICATES UNDERSTANDING. PT GIVEN COPY OF DISCHARGE INSTRCTIONS AND NEW
SCRIPT. PT'S PERSONAL BELONGINGS GATHERED AND SENT HOME WITH PT. PT ESCORTED
OFF UNIT WITH NURSING STAFF. PT LEFT IN PRIVATE VEHICLE.

## 2018-11-16 NOTE — NUR
ONCE PT WENT TO SLEEP, REMAINED ASLEEP. ASSISTED TO BR EARLIER, GAIT UNSTEADY.
TELEMETRY ON SHOWING SR. NO COMPLAINTS VOICED. HS GOALS OF REST AND SAFETY
ACHIEVED. HOURLY ROUNDING OBSERVED.

## 2018-11-16 NOTE — NUR
WAS NOTIFIED BY NURSING THAT PT READY FOR DC.  CALL TO WIFE/SERGIO.  SHE WAS
PLEASED AND WILL BE HERE BEFORE LUNCH. PT CONFUSED.  PT IS CURRENT WITH
BRYSON MAYORGA, CALLED AND FAXED DC ORDERS TO OSORIO/BRYSON.  NURSE UPDATED

## 2019-01-15 ENCOUNTER — HOSPITAL ENCOUNTER (INPATIENT)
Dept: HOSPITAL 96 - M.ERS | Age: 79
LOS: 3 days | Discharge: NURSING, REHAB OR LONG-TERM CARE | DRG: 682 | End: 2019-01-18
Attending: INTERNAL MEDICINE | Admitting: INTERNAL MEDICINE
Payer: OTHER GOVERNMENT

## 2019-01-15 VITALS — SYSTOLIC BLOOD PRESSURE: 148 MMHG | DIASTOLIC BLOOD PRESSURE: 69 MMHG

## 2019-01-15 VITALS — DIASTOLIC BLOOD PRESSURE: 57 MMHG | SYSTOLIC BLOOD PRESSURE: 113 MMHG

## 2019-01-15 VITALS — SYSTOLIC BLOOD PRESSURE: 150 MMHG | DIASTOLIC BLOOD PRESSURE: 70 MMHG

## 2019-01-15 VITALS — SYSTOLIC BLOOD PRESSURE: 133 MMHG | DIASTOLIC BLOOD PRESSURE: 73 MMHG

## 2019-01-15 VITALS — HEIGHT: 64.02 IN | WEIGHT: 164.9 LBS | BODY MASS INDEX: 28.15 KG/M2

## 2019-01-15 VITALS — DIASTOLIC BLOOD PRESSURE: 64 MMHG | SYSTOLIC BLOOD PRESSURE: 137 MMHG

## 2019-01-15 DIAGNOSIS — Z79.4: ICD-10-CM

## 2019-01-15 DIAGNOSIS — I12.9: ICD-10-CM

## 2019-01-15 DIAGNOSIS — E10.65: ICD-10-CM

## 2019-01-15 DIAGNOSIS — Z86.73: ICD-10-CM

## 2019-01-15 DIAGNOSIS — R47.01: ICD-10-CM

## 2019-01-15 DIAGNOSIS — E10.22: ICD-10-CM

## 2019-01-15 DIAGNOSIS — Z88.0: ICD-10-CM

## 2019-01-15 DIAGNOSIS — G47.00: ICD-10-CM

## 2019-01-15 DIAGNOSIS — N18.3: ICD-10-CM

## 2019-01-15 DIAGNOSIS — Z90.49: ICD-10-CM

## 2019-01-15 DIAGNOSIS — K50.90: ICD-10-CM

## 2019-01-15 DIAGNOSIS — F03.90: ICD-10-CM

## 2019-01-15 DIAGNOSIS — N17.9: Primary | ICD-10-CM

## 2019-01-15 DIAGNOSIS — G93.41: ICD-10-CM

## 2019-01-15 LAB
ABSOLUTE BASOPHILS: 0 THOU/UL (ref 0–0.2)
ABSOLUTE EOSINOPHILS: 0.2 THOU/UL (ref 0–0.7)
ABSOLUTE MONOCYTES: 0.6 THOU/UL (ref 0–1.2)
ALBUMIN SERPL-MCNC: 3.7 G/DL (ref 3.4–5)
ALP SERPL-CCNC: 87 U/L (ref 46–116)
ALT SERPL-CCNC: 31 U/L (ref 30–65)
ANION GAP SERPL CALC-SCNC: 8 MMOL/L (ref 7–16)
APTT BLD: 24.5 SECONDS (ref 25–31.3)
AST SERPL-CCNC: 26 U/L (ref 15–37)
BASOPHILS NFR BLD AUTO: 0.7 %
BILIRUB SERPL-MCNC: 0.4 MG/DL
BILIRUB UR-MCNC: NEGATIVE MG/DL
BUN SERPL-MCNC: 30 MG/DL (ref 7–18)
CALCIUM SERPL-MCNC: 8.9 MG/DL (ref 8.5–10.1)
CHLORIDE SERPL-SCNC: 107 MMOL/L (ref 98–107)
CK-MB MASS: 5.9 NG/ML
CO2 SERPL-SCNC: 29 MMOL/L (ref 21–32)
COLOR UR: YELLOW
CREAT SERPL-MCNC: 2.1 MG/DL (ref 0.6–1.3)
EOSINOPHIL NFR BLD: 4.4 %
GLUCOSE SERPL-MCNC: 185 MG/DL (ref 70–99)
GRANULOCYTES NFR BLD MANUAL: 52.9 %
HCT VFR BLD CALC: 41.3 % (ref 42–52)
HGB BLD-MCNC: 13.8 GM/DL (ref 14–18)
INR PPP: 1
KETONES UR STRIP-MCNC: NEGATIVE MG/DL
LIPASE: 247 U/L (ref 73–393)
LYMPHOCYTES # BLD: 1.6 THOU/UL (ref 0.8–5.3)
LYMPHOCYTES NFR BLD AUTO: 31.3 %
MCH RBC QN AUTO: 30 PG (ref 26–34)
MCHC RBC AUTO-ENTMCNC: 33.4 G/DL (ref 28–37)
MCV RBC: 89.7 FL (ref 80–100)
MONOCYTES NFR BLD: 10.7 %
MPV: 9 FL. (ref 7.2–11.1)
NEUTROPHILS # BLD: 2.7 THOU/UL (ref 1.6–8.1)
NT-PRO BRAIN NAT PEPTIDE: 67 PG/ML (ref ?–300)
NUCLEATED RBCS: 0 /100WBC
PLATELET COUNT*: 188 THOU/UL (ref 150–400)
POTASSIUM SERPL-SCNC: 4 MMOL/L (ref 3.5–5.1)
PROT SERPL-MCNC: 7 G/DL (ref 6.4–8.2)
PROT UR QL STRIP: NEGATIVE
PROTHROMBIN TIME: 10.7 SECONDS (ref 9.2–11.5)
RBC # BLD AUTO: 4.61 MIL/UL (ref 4.5–6)
RBC # UR STRIP: NEGATIVE /UL
RDW-CV: 13.8 % (ref 10.5–14.5)
SODIUM SERPL-SCNC: 144 MMOL/L (ref 136–145)
SP GR UR STRIP: 1.02 (ref 1–1.03)
TROPONIN-I LEVEL: <0.06 NG/ML (ref ?–0.06)
URINE CLARITY: CLEAR
URINE GLUCOSE-RANDOM: NEGATIVE
URINE LEUKOCYTES-REFLEX: NEGATIVE
URINE NITRITE-REFLEX: NEGATIVE
UROBILINOGEN UR STRIP-ACNC: 0.2 E.U./DL (ref 0.2–1)
WBC # BLD AUTO: 5.2 THOU/UL (ref 4–11)

## 2019-01-15 NOTE — NUR
PATIENT ADMITTED TO ROOM 304 FROM ER. ALERT AND ORIENTED TO NAME AND
BIRTHDATE. NO COMPLAINTS OF PAIN. PATIENT WAS PLACED ON CARDIAC MONITOR ON
ADMISSION, TRACING NSR. PATIENT REMOVED CARDIAC MONITOR X 2. PATIENT ALSO DC'D
OWN IV SL. DR. PABLO NOTIFIED AND OK TO STOP TELE AND LEAVE IV OUT AT THIS
TIME. PATIENT UP SBA TO BSC, STEADY GAIT NOTED. FALL RISK PROTOCOL IN PLACE.
ACCUCHECK AC/HS, DR. PABLO WAS NOTIFIED AT LUNCH THAT PATIENTS BS 
BUT NO ORDERS FOR INSULIN GIVEN AT THAT TIME. NO SKIN BREAKDOWN NOTED.
ORIENTED TO CALL LIGHT. PATIENTS BEEN UP TO CHAIR SINCE ARRIVAL TO ROOM THIS
AM. BED/CHAIR ALARM IN PLACE.

## 2019-01-15 NOTE — NUR
ASSUMED PATIENT CAER AT 1900.  PATIENT CONFUSED, DX DEMENTIA.  NO COMPLAINTS
OF PAIN OR DISCOMFORT NOTED.  SPOUSE CAME TO HOSPITAL AFTER PATIENT CALLED
HER.  SPOUSE DID NOT ENTER ROOM.  RN STATED THAT HE WAS SLEEPING AND SHE LEFT.
 RN ASSESSMENT AS DOCUMENTED.

## 2019-01-15 NOTE — CON
[facility] 
[address] [place]  [address]                    CONSULTATION                  
_______________________________________________________________________________
 
Name:       ELVIRA PICKARD                  Room:           [address] [place]  
.R.#:  [id number]      Account #:      [id number]  
Admission:  01/15/19     Attend Phys:    TRE Ott
Discharge:               Date of Birth:  [birth year]  
         Report #: [id number]
                                                                     [id number]  
_______________________________________________________________________________
THIS REPORT FOR:  //name//                      
 
CC: Physician staff
    KONSTANTIN Koch
 
DATE OF SERVICE:  01/16/2019
 
 
HISTORY OF PRESENT ILLNESS:  This is a 78-year-old male patient who was admitted
with confusion.
 
I do not have any prior records.  [facility] 
We will [facility] to get the records from there.  He started having memory issues
about 3 years ago.  It started spontaneously without any trauma.  It fluctuates
and it is worse in the evening.  The memory problem is severe and he requires
24-hour supervision.
 
The rest of the history is not clear.  We considered the diagnosis of Parkinson
dementia complex or Lewy body dementia, but I do not have any records there. 
They also think the patient had a seizure there.  History in that regard is very
poorly defined.  He is on Keppra 750 mg p.o. b.i.d.  He was admitted with more
confusion.  He has a history of apparently stroke.  I do not know how it
affected him.  His CT scan does indicate what looks like a lacunar CVA.  He has
a history of diabetes, Crohn's disease, dementia, kidney problems, colon
resection, hypertension.  His vision is unchanged.  He denies any new ENT
problems.  He denies any new cardiac, respiratory, musculoskeletal,
constitutional, dermatological, hematological, psychiatric, throat, allergic
symptom associated with present symptomatology.  He did have some abdominal
symptoms, but he was not complaining of that when I saw him.
 
PAST MEDICAL HISTORY:  Positive for dementia.
 
FAMILY HISTORY:  Negative for early age stroke.
 
SOCIAL HISTORY:  He lives with his wife.  Wife provides 24-hour supervision.  He
is able to do many things by himself, but cannot drive and cannot be left alone.
 
PHYSICAL EXAMINATION:  Indicate he is alert.  He is not oriented.  He does not
know what month it is, what hospital he is in or who the president is.  His
speech also looks somewhat unusual, but that may have been because of his
stroke.  Fund of knowledge and memory is markedly diminished.  Cranial nerve
examination 2-12 was attempted.  It is difficult because of his mentation, but
appeared unremarkable.  Strength, sensation, reflexes and tone is symmetrical,
although it took a while to do the position sense, but I think he can appreciate
that.  Reflexes are diminished.  There is no meningeal sign.  Finger-to-nose is
 
 
 
[place] [address]                    CONSULTATION                  
_______________________________________________________________________________
 
Name:       MELLYELVIRA CORTEZ                  Room:           [address] [place]  
.R.#:  [id number]      Account #:      [id number]  
Admission:  01/15/19     Attend Phys:    TRE Ott
Discharge:               Date of Birth:  [birth year]  
         Report #: [id number]
                                                                     [id number]  
_______________________________________________________________________________
unremarkable.  There is no carotid bruit.  There is no thyroid mass.  He is
moderately built individual who does not have any dysmorphic features of eyes,
ears and face.  Blood pressure is 164/72, pulse is 84, temperature is 98.5.
 
LABORATORY DATA:  Indicate a white count of 5.2.  Sodium is normal.  His CT scan
showed a question of old lacunar CVA.  His pulses are difficult to feel.  He has
no edema, cyanosis or jaundice.  His cardiac examination is unremarkable.  No
respiratory difficulty or rhonchi was noticed.
 
IMPRESSION:
1.  Advanced dementia.
2.  History of cerebrovascular accidents in the past and therefore part of it
may be multiinfarct dementia.
3.  His last B12 was somewhat on the lower side of the normal.  We will recheck
it, especially with his prior colon surgery, etc.
 
RECOMMENDATIONS:
1.  [facility].
2.  I talked to the nurses and they will try to get it and we would reevaluate
that tomorrow.
3.  I will get an EEG done since there is a history of seizure.
4.  We will repeat the B12 and if it is less than 400, I will suggest correcting
it.
 
Thanking you for this referral.
 
 
 
 
 
 
 
 
 
 
 
 
 
 
 
 
 
 
 
                       
                                        By:                                
                 
D: 01/16/19 1233_______________________________________
T: 01/16/19 1251Psegundo Dye MD                /nt

## 2019-01-15 NOTE — EEG
[address]  [address]                    EEG STUDY REPORT              
_______________________________________________________________________________
 
Name:       ELVIRA PICKARD                  Room:           [address] [place]  
.#:  [id number]      Account #:      [id number]  
Admission:  01/15/19     Attend Phys:    TRE Ott
Discharge:               Date of Birth:  [birth year]  
         Report #: [id number]
                                                                     [id number]  
_______________________________________________________________________________
THIS REPORT FOR:  //name//                      
 
CC: Physician staff
    KONSTANTIN Koch
 
DATE OF SERVICE:  01/16/2019
 
 
This patient is being evaluated for altered mental status.  The patient's EEG
was done by placing the electrode by standard 10-20 system of electrode
placement.  Both referential and sequential montages were used for recording. 
Background activity in this patient's EEG is about 9 Hz and 30 microvolts.  This
patient became drowsy and that is associated with bilaterally symmetrical sleep
spindle and vertex sharp waves.  Photic stimulation is unremarkable.  Throughout
the record, no active epileptiform activity was noticed.
 
IMPRESSION:  This patient's EEG is intermixed with theta range slowing on both
sides.  That is a nonspecific finding, which can occur with dementia,
encephalopathy, effects of psychotropic medication, etc.  Clinical correlation
is recommended.  Finding is nonspecific.
 
 
 
 
 
 
 
 
 
 
 
 
 
 
 
 
 
 
 
 
 
 
 
                       
                                        By:                                
                 
D: 01/17/19 1306_______________________________________
T: 01/17/19 1316Arben Dye MD                /nt

## 2019-01-15 NOTE — EKG
[place] [address]
Phone:  [phone]                     ELECTROCARDIOGRAM REPORT      
_______________________________________________________________________________
 
Name:       ELVIRA PICKARD                  Room:           [address]    ADM [place]  
[facility].#:  [id number]      Account #:      [id number]  
Admission:  01/15/19     Attend Phys:    TRE Ott
Discharge:               Date of Birth:  [birth year]  
         Report #: [id number]
    [id number]
_______________________________________________________________________________
THIS REPORT FOR:  //name//                      
 
                         [facility] ED
                                       
Test Date:    2019-01-15               Test Time:    08:10:18
Pat Name:     ELVIRA PICKARD              Department:   
Patient ID:   [id number]            Room:         [facility]
Gender:       M                        Technician:   NOEL
:          1940               Requested By: Henry Wagoner
Order Number: [id number] MD:   Irving Webber
                                 Measurements
Intervals                              Axis          
Rate:         64                       P:            71
DE:           244                      QRS:          -24
QRSD:         100                      T:            36
QT:           402                                    
QTc:          415                                    
                           Interpretive Statements
Sinus rhythm
Prolonged DE interval
Borderline left axis deviation
Borderline low voltage, extremity leads
Abnormal R-wave progression, late transition
Baseline wander in lead(s) V3
Compared to ECG 11/15/2018 14:51:42
First degree AV block now present
 
Electronically Signed On 1- 16:26:40 CST by Irving Webber
[email or web address]
 
 
 
 
 
 
 
 
 
 
 
 
 
 
  <ELECTRONICALLY SIGNED>
                                           By: Irving Webber MD, FACC     
  01/15/19     1626
D: 01/15/19 0810   _____________________________________
T: 01/15/19 0810   Irving Webber MD, FACC       /EPI

## 2019-01-16 VITALS — DIASTOLIC BLOOD PRESSURE: 64 MMHG | SYSTOLIC BLOOD PRESSURE: 120 MMHG

## 2019-01-16 VITALS — SYSTOLIC BLOOD PRESSURE: 164 MMHG | DIASTOLIC BLOOD PRESSURE: 72 MMHG

## 2019-01-16 VITALS — SYSTOLIC BLOOD PRESSURE: 151 MMHG | DIASTOLIC BLOOD PRESSURE: 78 MMHG

## 2019-01-16 NOTE — NUR
SW called pt wife to complete initial assessment, introduce self, and SW role
and discuss safe dc planning and placement recommendations/options.  Pt wife
agrees that pt would need placement at dc and said that she was open to
[facility].  Pt wife preferences for [facility] [place], [facility], [facility] in [place].  SW to continue to follow to
assist with safe dc planning.

## 2019-01-16 NOTE — NUR
PATIENT ALERT AND ORIENTED TO SELF AND BIRTHDATE ONLY THIS SHIFT. WIFE AND
DAUGHTER HERE THIS AFTERNOON VISITING. DR. SMITH [facility], RECEIVED AND PLACED ON CHART. PATIENT HAD AN EEG TODAY. NO IV ACCESS.
PATIENT UP TO BATHROOM WITH ASSISTANCE, BM NOTED THIS SHIFT. PT/OT/ST ORDERED
FOR PATIENT THIS SHIFT. SNF PLACEMENT PENDING.

## 2019-01-16 NOTE — NUR
RESUMED CARE OF PATIENT AS 2315. I AGREE WITH PREVIOUS NURSES DOCUMENTATION.
PT HAD SEVERAL FORMED BOWEL MOVEMENTS THIS SHIFT. PT ORIENTED TO SELF ONLY.
WILL CONTINUE WITH PLAN OF CARE.

## 2019-01-17 VITALS — SYSTOLIC BLOOD PRESSURE: 180 MMHG | DIASTOLIC BLOOD PRESSURE: 84 MMHG

## 2019-01-17 VITALS — DIASTOLIC BLOOD PRESSURE: 58 MMHG | SYSTOLIC BLOOD PRESSURE: 112 MMHG

## 2019-01-17 NOTE — NUR
PATIENT IS ALERT TODAY, ORIENTED TO SELF AND SOMETIMES PLACE. NO COMPLAINTS OF
ANY KIND TODAY. APPETITE IS GOOD, UP AD KRISTOPHER IN ROOM, WALKS AROUND ROOM AT
TIMES. CALL LIGHT IS IN REACH, WILL CONTINUE TO MONITOR.

## 2019-01-17 NOTE — NUR
SW faxed referral to pt/family preference of [facility] in
preparation for dc plan for post acute care stay.  [phone] fax [phone].
Representative from [facility] visited yesterday and stated that pt is active
with their HH services.  SW to continue to follow to assist with safe dc
planning and SNF placement.  Possibly [facility] as pt/family second choice if
needed.

## 2019-01-17 NOTE — NUR
PATIENT SLEPT MOST OF THE NIGHT. NO COMPLAINTS OF PAIN. PATIENT HAS BEEN
PLEASANTLY CONFUSED. WILL CONTINUE TO MONITOR.

## 2019-01-18 VITALS — SYSTOLIC BLOOD PRESSURE: 134 MMHG | DIASTOLIC BLOOD PRESSURE: 65 MMHG

## 2019-01-18 VITALS — SYSTOLIC BLOOD PRESSURE: 119 MMHG | DIASTOLIC BLOOD PRESSURE: 73 MMHG

## 2019-01-18 VITALS — DIASTOLIC BLOOD PRESSURE: 73 MMHG | SYSTOLIC BLOOD PRESSURE: 119 MMHG

## 2019-01-18 LAB
ABSOLUTE BASOPHILS: 0 THOU/UL (ref 0–0.2)
ABSOLUTE EOSINOPHILS: 0.2 THOU/UL (ref 0–0.7)
ABSOLUTE MONOCYTES: 0.8 THOU/UL (ref 0–1.2)
ANION GAP SERPL CALC-SCNC: 7 MMOL/L (ref 7–16)
BASOPHILS NFR BLD AUTO: 0.6 %
BUN SERPL-MCNC: 26 MG/DL (ref 7–18)
CALCIUM SERPL-MCNC: 9.5 MG/DL (ref 8.5–10.1)
CHLORIDE SERPL-SCNC: 107 MMOL/L (ref 98–107)
CO2 SERPL-SCNC: 31 MMOL/L (ref 21–32)
CREAT SERPL-MCNC: 2.1 MG/DL (ref 0.6–1.3)
EOSINOPHIL NFR BLD: 3.3 %
GLUCOSE SERPL-MCNC: 153 MG/DL (ref 70–99)
GRANULOCYTES NFR BLD MANUAL: 56.3 %
HCT VFR BLD CALC: 42 % (ref 42–52)
HGB BLD-MCNC: 14 GM/DL (ref 14–18)
LYMPHOCYTES # BLD: 1.8 THOU/UL (ref 0.8–5.3)
LYMPHOCYTES NFR BLD AUTO: 28.2 %
MCH RBC QN AUTO: 29.7 PG (ref 26–34)
MCHC RBC AUTO-ENTMCNC: 33.3 G/DL (ref 28–37)
MCV RBC: 89.2 FL (ref 80–100)
MONOCYTES NFR BLD: 11.6 %
MPV: 9.4 FL. (ref 7.2–11.1)
NEUTROPHILS # BLD: 3.7 THOU/UL (ref 1.6–8.1)
NUCLEATED RBCS: 0 /100WBC
PLATELET COUNT*: 196 THOU/UL (ref 150–400)
POTASSIUM SERPL-SCNC: 4 MMOL/L (ref 3.5–5.1)
RBC # BLD AUTO: 4.71 MIL/UL (ref 4.5–6)
RDW-CV: 13.9 % (ref 10.5–14.5)
SODIUM SERPL-SCNC: 145 MMOL/L (ref 136–145)
WBC # BLD AUTO: 6.5 THOU/UL (ref 4–11)

## 2019-01-18 NOTE — NUR
Pt accepted to [facility] for SNF on memory care and possibility of
transition to LTC.  OTTO spoke with Dr Barber about dc planning as well as pt
wife.  OTTO spoke with Carola in admissions for [facility] who arranged ride for 5:30 pm.  OTTO faxed final orders to attn Carola. OTTO
discussed need for DPOA and Pauly, [facility] was [facility] speak with [facility] to
[facility]ed [facility] that they had on file...DPOA to be faxed to Carola.
Alexandra Cardenas ph [phone], pt nurse aware.

## 2019-01-18 NOTE — NUR
D/C PLANNER SPOKE TO BARRIE WITH ADMISSIONS AT [facility] AND FAXED
A REFERRAL. BARRIE HERE AT THE HOSPITAL TO DO AN ON-SITE VISIT WITH THE PATIENT
AND SPOUSE AND PROVIDE SPOUSE INFO. CM WILL REMAIN AVAILABLE TO ASSIST AND
FOLLOW AS NEEDED.

## 2019-01-18 NOTE — NUR
PATIENT HAS BEEN ALERT TODAY. PLEASANT. VITAL SIGNS STABLE ON ROOM AIR. NO
COMPLAINTS OF ANY KIND TODAY. PATIENT IS BEING DISCHARGED TO LONG TERM CARE
FACILITY, FAMILY NOTIFIED. REPORT AND CALLED AND GIVEN TO BERTHA AT [facility].

## 2019-01-18 NOTE — NUR
OTTO called Maryuri in admissions at [facility] to follow up on
referral and Maryuri is still reviewing possible admission for SNF for pt.  Pt
wife mentioned [facility]; SW to call for another possible options if pt not
accepted to SNF. SW to continue to follow to assist with safe dc
planning/placement.

## 2019-01-18 NOTE — NUR
PATIENT SLEPT PART OF THE NIGHT. PATIENT REMAINS CONFUSED WONDERING WHERE HE
IS AND WHY HE IS IN THE HOSPITAL HAD TO BE REDIRECTED AND ORIENTED A FEW
TIMES. WILL CONTINUE TO MONITOR.